# Patient Record
Sex: MALE | Race: WHITE | NOT HISPANIC OR LATINO | Employment: FULL TIME | URBAN - METROPOLITAN AREA
[De-identification: names, ages, dates, MRNs, and addresses within clinical notes are randomized per-mention and may not be internally consistent; named-entity substitution may affect disease eponyms.]

---

## 2020-09-02 ENCOUNTER — HOSPITAL ENCOUNTER (EMERGENCY)
Facility: MEDICAL CENTER | Age: 35
End: 2020-09-02
Attending: EMERGENCY MEDICINE
Payer: COMMERCIAL

## 2020-09-02 VITALS
HEART RATE: 96 BPM | TEMPERATURE: 98.2 F | DIASTOLIC BLOOD PRESSURE: 89 MMHG | OXYGEN SATURATION: 98 % | SYSTOLIC BLOOD PRESSURE: 145 MMHG | WEIGHT: 289.9 LBS | BODY MASS INDEX: 37.21 KG/M2 | HEIGHT: 74 IN | RESPIRATION RATE: 16 BRPM

## 2020-09-02 DIAGNOSIS — S61.210A LACERATION OF RIGHT INDEX FINGER WITHOUT FOREIGN BODY WITHOUT DAMAGE TO NAIL, INITIAL ENCOUNTER: ICD-10-CM

## 2020-09-02 PROCEDURE — 700101 HCHG RX REV CODE 250

## 2020-09-02 PROCEDURE — 304217 HCHG IRRIGATION SYSTEM

## 2020-09-02 PROCEDURE — 303747 HCHG EXTRA SUTURE

## 2020-09-02 PROCEDURE — 99283 EMERGENCY DEPT VISIT LOW MDM: CPT

## 2020-09-02 PROCEDURE — 304999 HCHG REPAIR-SIMPLE/INTERMED LEVEL 1

## 2020-09-02 RX ORDER — LIDOCAINE HYDROCHLORIDE AND EPINEPHRINE BITARTRATE 20; .01 MG/ML; MG/ML
10 INJECTION, SOLUTION SUBCUTANEOUS ONCE
Status: COMPLETED | OUTPATIENT
Start: 2020-09-02 | End: 2020-09-02

## 2020-09-02 RX ORDER — LIDOCAINE HYDROCHLORIDE 20 MG/ML
20 INJECTION, SOLUTION INFILTRATION; PERINEURAL ONCE
Status: COMPLETED | OUTPATIENT
Start: 2020-09-02 | End: 2020-09-02

## 2020-09-02 RX ADMIN — LIDOCAINE HYDROCHLORIDE 20 ML: 20 INJECTION, SOLUTION INFILTRATION; PERINEURAL at 20:15

## 2020-09-03 NOTE — ED PROVIDER NOTES
ED Provider Note    CHIEF COMPLAINT  Chief Complaint   Patient presents with   • Finger Injury     Laceration to right Index Finger       [facility] [place]  Dalton Farias is a 34 y.o. male who presents with complaint of laceration to right index finger.  Patient reports that he was in a pocket knife to open a box when it slipped and he cut his right index finger.  He denies any associated weakness or numbness.  He denies any other injury sustained.  He denies any difficulty moving his index finger since that time.  Patient reports he had a tetanus updated in the last 5 years.    REVIEW OF SYSTEMS  ROS  See HPI for further details. All other systems are negative.     PAST MEDICAL HISTORY       SOCIAL HISTORY  Social History     Tobacco Use   • Smoking status: Never Smoker   • Smokeless tobacco: Never Used   Substance and Sexual Activity   • Alcohol use: Not Currently   • Drug use: Not Currently   • Sexual activity: Not on file       SURGICAL HISTORY  patient denies any surgical history    CURRENT MEDICATIONS  Home Medications     Reviewed by Jay Jay Stauffer R.N. (Registered Nurse) on 09/02/20 at 1921  Med List Status: Not Addressed   Medication Last Dose Status        Patient Serafin Taking any Medications                       ALLERGIES  No Known Allergies    PHYSICAL EXAM  Physical Exam   Constitutional: He is oriented to person, place, and time. He appears well-developed and well-nourished.   Eyes: Conjunctivae are normal.   Neck: Normal range of motion. Neck supple.   Pulmonary/Chest: Effort normal.   Musculoskeletal:      Comments: Right exterior with 3 cm laceration at the proximal phalanx on the radial aspect.  Laceration is to muscle without any obvious violation of the tendon.  Strength is 5/5 in flexion and extension of the first digit.  Isolated flexion of the DIP is intact.  Interosseous muscles are 5 out of 5.  Abduction and abduction of the finger is 5 out of 5.  Sensation is intact throughout.  Cap refill is  instantaneous.   Neurological: He is alert and oriented to person, place, and time.   Skin: Skin is warm.   Psychiatric: He has a normal mood and affect. His behavior is normal.     Laceration Repair Procedure    Indication: Laceration    Location/Description: ight exterior with 3 cm laceration at the proximal phalanx on the radial aspect.    Procedure: The patient was placed in the appropriate position and anesthesia around the laceration was obtained by infiltration using 2% Lidocaine without epinephrine. The area was then irrigated with high pressure normal saline. The laceration was closed with 4-0 Ethilon using interrupted sutures. There were no additional lacerations requiring repair. The wound area was then dressed with a sterile dressing.      Total repaired wound length: 3 cm.     Other Items: Suture count: 8    The patient tolerated the procedure well.    Complications: None      COURSE & MEDICAL DECISION MAKING  Pertinent Labs & Imaging studies reviewed. (See chart for details)    Patient here with laceration to the finger without any obvious tendon injury however given the depth will place patient in splint after it is irrigated and repaired.  Patient tetanus is up-to-date.  Patient laceration repaired.  He was placed in a finger splint extending into his palm to mobilize the PIP and MCP and DIP.  Patient will follow-up with orthopedics for wound recheck in the next 7 days.  I have discussed return precautions including development infection.       The patient will return for worsening symptoms and is stable at the time of discharge. The patient verbalizes understanding and will comply.    FINAL IMPRESSION    1. Laceration of right index finger without foreign body without damage to nail, initial encounter               Electronically signed by: Cliff Sorenson M.D., 9/2/2020 7:44 PM

## 2020-09-03 NOTE — ED TRIAGE NOTES
"Dalton Farias   34 y.o. male   Chief Complaint   Patient presents with   • Finger Injury     Laceration to right Index Finger      The patient presents to the ED via triage for evaluation of a right index finger laceration. The patient reports opening a package with a knife and cutting himself as the blade of his knife was retracting. Bleeding controlled by direct patient pressure.     The patient denies knowingly being around anyone with suspected or confirmed COVID 19.    Patient ambulatory to triage with a steady gait for above mentioned complaint.  Patient is alert and oriented, speaking in full sentences, following commands and responds appropriately to questions. Not in any apparent distress. Respirations are even and unlabored.   Patient placed in lobby. Patient educated on triage process. Patient encouraged to alert staff of any changes in status.     /102   Pulse (!) 102   Temp 36.8 °C (98.2 °F) (Temporal)   Resp 16   Ht 1.88 m (6' 2\")   Wt (!) 131.5 kg (289 lb 14.5 oz)   SpO2 96%   BMI 37.22 kg/m²       "

## 2020-09-03 NOTE — DISCHARGE INSTRUCTIONS
Sutures will need to be removed in 7 to 10 days.  Please maintain the splint until that time. You will need to periodically remove the splint in order to change your  wound dressings.

## 2020-11-23 ENCOUNTER — TELEPHONE (OUTPATIENT)
Dept: SCHEDULING | Facility: IMAGING CENTER | Age: 35
End: 2020-11-23

## 2020-12-03 ENCOUNTER — OFFICE VISIT (OUTPATIENT)
Dept: MEDICAL GROUP | Facility: IMAGING CENTER | Age: 35
End: 2020-12-03
Payer: COMMERCIAL

## 2020-12-03 VITALS
HEIGHT: 74 IN | HEART RATE: 86 BPM | BODY MASS INDEX: 35.94 KG/M2 | SYSTOLIC BLOOD PRESSURE: 110 MMHG | RESPIRATION RATE: 12 BRPM | TEMPERATURE: 97.7 F | DIASTOLIC BLOOD PRESSURE: 68 MMHG | WEIGHT: 280 LBS | OXYGEN SATURATION: 95 %

## 2020-12-03 DIAGNOSIS — Z13.220 SCREENING FOR HYPERLIPIDEMIA: ICD-10-CM

## 2020-12-03 DIAGNOSIS — J32.9 CHRONIC SINUSITIS, UNSPECIFIED LOCATION: ICD-10-CM

## 2020-12-03 DIAGNOSIS — Z87.442 HISTORY OF KIDNEY STONES: ICD-10-CM

## 2020-12-03 DIAGNOSIS — Z13.1 SCREENING FOR DIABETES MELLITUS (DM): ICD-10-CM

## 2020-12-03 DIAGNOSIS — Z87.09 HISTORY OF NASAL POLYP: ICD-10-CM

## 2020-12-03 DIAGNOSIS — Z76.89 ENCOUNTER TO ESTABLISH CARE WITH NEW DOCTOR: ICD-10-CM

## 2020-12-03 DIAGNOSIS — Z13.0 SCREENING FOR DEFICIENCY ANEMIA: ICD-10-CM

## 2020-12-03 DIAGNOSIS — R23.8 SKIN BREAKDOWN: ICD-10-CM

## 2020-12-03 DIAGNOSIS — L60.0 INGROWN NAIL OF FIFTH TOE OF RIGHT FOOT: ICD-10-CM

## 2020-12-03 DIAGNOSIS — Z30.09 VASECTOMY EVALUATION: ICD-10-CM

## 2020-12-03 PROBLEM — J33.9 NASAL POLYPOSIS: Status: ACTIVE | Noted: 2019-03-08

## 2020-12-03 PROBLEM — J34.3 HYPERTROPHY OF NASAL TURBINATES: Status: ACTIVE | Noted: 2019-03-08

## 2020-12-03 PROCEDURE — 99203 OFFICE O/P NEW LOW 30 MIN: CPT | Performed by: NURSE PRACTITIONER

## 2020-12-03 SDOH — HEALTH STABILITY: MENTAL HEALTH: HOW OFTEN DO YOU HAVE A DRINK CONTAINING ALCOHOL?: 2-4 TIMES A MONTH

## 2020-12-03 SDOH — HEALTH STABILITY: MENTAL HEALTH: HOW OFTEN DO YOU HAVE 6 OR MORE DRINKS ON ONE OCCASION?: LESS THAN MONTHLY

## 2020-12-03 SDOH — HEALTH STABILITY: MENTAL HEALTH: HOW MANY STANDARD DRINKS CONTAINING ALCOHOL DO YOU HAVE ON A TYPICAL DAY?: 1 OR 2

## 2020-12-03 ASSESSMENT — PATIENT HEALTH QUESTIONNAIRE - PHQ9: CLINICAL INTERPRETATION OF PHQ2 SCORE: 0

## 2020-12-03 ASSESSMENT — PAIN SCALES - GENERAL: PAINLEVEL: NO PAIN

## 2020-12-03 NOTE — PROGRESS NOTES
Subjective:   CC: Establish Care, Ingrown Toenail (right side), and Foot Pain (left side, split skin)    HISTORY OF THE PRESENT ILLNESS: Patient is a 34 y.o. male. His prior PCP at [facility], [place], [place].  Patient has history of snoring, chronic sinusitis, hypertrophy of nasal turbinates, and, kidney stones. Patient is here today to establish care and discuss referrals to health history and concerns about his feet.     Reports he would like a referral to explore getting a vasectomy. Reports he is new to the area and him and his wife have been discussing this option for continued birth control. Reports having a 5 year old and 1 year old and considering being done having children. Reports history of kidney stones in 2019.    Reports since moving to [place] he has lost 27 lbs over the past 6 weeks. States he has changed his diet and working on portion control. States he has also become more physically activity by walking and hiking. States he is happy with the results he has achieved and is feeling good.    States he has history of ingrown toenails on his greater toes. Reports never having surgery to remove ingrown toenail and/or using antibiotics for infected ingrown toe nails. Reports he currently has an ingrown toe nail on right greater toe. Denies infection at this time. Reports that toe is tender to touch in certain portions and/or pressure. States he will get ingrown toe nails depending how he cuts his nails. Reports after this summer he also has a cracked left heel. States it is healing and improving since fall and starting to wear socks more often. States at its worse it was painful to fully apply pressure on his heel. Reports the daily pain is resolving and it is not painful anymore and it is slowly healing. Reports using OTC lotion to area.     Chronic sinusitis  Reports he has a history of ongoing nasal concerns. States symptoms have improved since he had sinus surgery. Denies any concerns at this  time. Reports he would like a referral to ENT since just moving to area and needing new ENT. Reports he use to see his ENT in [place], [place] about every 6 months. Reports using a daily nasal spray, unable to recall name. Will provide name at later date.    Allergies: Sulfa drugs    No current [facility]-ordered outpatient medications on file.     No current [facility]-ordered facility-administered medications on file.      Past Medical History:   Diagnosis Date   • Allergy     seasonal     Past Surgical History:   Procedure Laterality Date   • NASAL POLYPECTOMY  2019   • OTHER ORTHOPEDIC SURGERY      left hand surgery-1st, nerve repair, 2nd trigger finger   • OTHER ORTHOPEDIC SURGERY      right knee     Social History     Tobacco Use   • Smoking status: Former Smoker   • Smokeless tobacco: Former User   Substance Use Topics   • Alcohol use: Yes     Frequency: 2-4 times a month     Drinks per session: 1 or 2     Binge frequency: Less than monthly   • Drug use: Not Currently     Social History     Social History Narrative   • Not on file     Family History   Problem Relation Age of Onset   • Obesity Mother    • Obesity Father    • No Known Problems Sister    • Cancer Maternal Grandmother    • Alcohol abuse Maternal Grandfather    • Lung Disease Maternal Grandfather         smoker   • Lung Disease Paternal Grandmother         smoked   • Alcohol abuse Paternal Grandmother    • No Known Problems Paternal Grandfather      Health Maintenance: Completed.    ROS:   Constitutional: Denies fever, chills, night sweats, weight gain and/or malaise/fatigue.  Intentional weight loss, see HPI.  HENT: Denies sore throat, hearing loss, enlarged thyroid, or difficulty swallowing.  Chronic nasal congestion, see HPI.  Eyes: Denies changes in vision, pain. Does wear corrective wear, glasses for reading.  Respiratory: Denies cough, SOB at rest or activity.    Cardiovascular: Denies tachycardia, chest pain, palpitations, or  leg swelling.  "  Gastrointestinal: Denies N/V/C/D, abdominal pain, loss appetite, reflux, or hematochezia.  Genitourinary: Denies difficulty voiding, dysuria, nocturia, or hematuria.  History of kidney stones, see HPI.  Skin: Negative for rash or worrisome moles. Ingrown toenail and cracked heel, see HPI.  Neurological: Negative for dizziness, focal weakness and headaches.   Endo/Heme/Allergies: Denies bruise/bleed easily, allergies.   Psychiatric/Behavioral: Denies depression, nervous/anxious, difficulty sleeping.     Objective:   Exam: /68   Pulse 86   Temp 36.5 °C (97.7 °F)   Resp 12   Ht 1.88 m (6' 2\")   Wt (!) 127 kg (280 lb)   SpO2 95%  Body mass index is 35.95 kg/m².    General: Normal appearing. No distress.  HEENT: Normocephalic. Eyes conjunctiva clear lids without ptosis, PERRLA, ears normal shape and contour, canals are clear bilaterally, tympanic membranes pearly gray, intact, mildly bulging, no drainage noted. Oral and nasal examine deferred due to current COVID-19 outbreak, no acute concerns at this time. Patient wore a mask during visit.  Neck: Supple without JVD or abnormal masses. Small soft mobile thyroid palpated, no nodules palpated, non-tender.  Pulmonary: Clear to ausculation. Normal effort. No rales, ronchi, or wheezing.  Cardiovascular: Regular rate and rhythm without murmur. Pedal and radial pulses are intact and equal bilaterally.  Abdomen: Soft, nontender, nondistended. Normal bowel sounds. Liver and spleen are not palpable.  Neurologic: Grossly non-focal.  Lymph: No cervical, submandibular, or supraclavicular lymph nodes are palpable.  Skin: Warm and dry. Right greater toe ingrown toe nail, no infection noted at this time. Tenderness noted with light pressure. Curved toenails noted on both greater toes suggesting patient is prone to ingrown toe nails. Deep healing crack noted on left heel. No signs of infection at this time.    Musculoskeletal: Normal gait. No extremity cyanosis, clubbing, " or edema. DTR+2  Psych: Normal mood and affect. Alert and oriented x3. Judgment and insight is normal.    Assessment & Plan:   1. Encounter to establish care with new doctor  2. Screening for deficiency anemia  3. Screening for hyperlipidemia  4. Screening for diabetes mellitus (DM)  Reviewed with patient medication use and side effects. Medical, past, surgical history reviewed with patient. Discussed with patient the risk and benefits of receiving vaccines. Discussed CDC recommendations for immunizations and USPSTF guidelines for screening exams. Discussed receiving influenza vaccine,  verbalized understanding and declined. Encouraged patient to wash hands regularly and avoid sick contacts while supporting immune system. Praise and support given on recent weight loss.  Discussed the overall benefit of a well-balanced diet, regular exercise, and stress management, verbalized understanding. Encouraged accumulation of 150 minutes or more of moderate-intensity activity each week as tolerated. Discussed a healthy diet that is low in fat, sodium, and cholesterol, such as the mediterranean diet that is typically high in fruits, vegetables, whole grains, beans, nuts, and seeds, includes olive oil as an important source of monounsaturated fat, DASH diet, and/or also consider a plant-based diet.  Discussed preventive screening labs with patient, verbalized understanding. Will evaluate plan of care once labs are obtained.    -     Comp Metabolic Panel; Future        -     CBC WITH DIFFERENTIAL; Future        -     Lipid Profile; Future    5. Chronic sinusitis, unspecified location  6. History of nasal polyp  This is a chronic stable condition. Discussed referral to ENT for further evaluation, verbalized understanding and willingness to participation in referral.     -     REFERRAL TO ENT    7. Vasectomy evaluation  8. History of kidney stones  This is a chronic stable condition. Discussed referral to urology for further  evaluation and management, verbalized understanding and willingness to participation in referral.     -     REFERRAL TO UROLOGY    9. Ingrown nail of fifth toe of right foot  10. Skin breakdown  This is a chronic stable condition.  Discussed physical assessment findings with patient, verbalized understanding. Discussed referral to podiatry for further evaluation and management, verbalized understanding and willingness to participation in referral. Discussed the importance of not cutting nails too short and avoiding shoes that apply pressure to nailbed, verbalized understanding and willingness. Discussed signs and symptoms of infections and when to report it, verbalized understanding. Discussed a trial of Lanolin to dry cracked heel at HS and am, verbalized understanding and willingness.      -     REFERRAL TO PODIATRY    Return in about 3 months (around 3/3/2021) for Annual visit after completing lab work.    Please note that this dictation was created using voice recognition software. I have made every reasonable attempt to correct obvious errors, but I expect that there are errors of grammar and possibly content that I did not discover before finalizing the note.

## 2020-12-06 NOTE — ASSESSMENT & PLAN NOTE
Reports he has a history of ongoing nasal concerns. States symptoms have improved since he had sinus surgery. Denies any concerns at this time. Reports he would like a referral to ENT since just moving to area and needing new ENT. Reports he use to see his ENT in [place], [place] about every 6 months. Reports using a daily nasal spray, unable to recall name. Will provide name at later date.

## 2020-12-18 ENCOUNTER — HOSPITAL ENCOUNTER (OUTPATIENT)
Dept: LAB | Facility: MEDICAL CENTER | Age: 35
End: 2020-12-18
Attending: NURSE PRACTITIONER
Payer: COMMERCIAL

## 2020-12-18 DIAGNOSIS — Z13.1 SCREENING FOR DIABETES MELLITUS (DM): ICD-10-CM

## 2020-12-18 DIAGNOSIS — Z13.0 SCREENING FOR DEFICIENCY ANEMIA: ICD-10-CM

## 2020-12-18 DIAGNOSIS — Z13.220 SCREENING FOR HYPERLIPIDEMIA: ICD-10-CM

## 2020-12-18 LAB
ALBUMIN SERPL BCP-MCNC: 4.6 G/DL (ref 3.2–4.9)
ALBUMIN/GLOB SERPL: 1.6 G/DL
ALP SERPL-CCNC: 49 U/L (ref 30–99)
ALT SERPL-CCNC: 30 U/L (ref 2–50)
ANION GAP SERPL CALC-SCNC: 8 MMOL/L (ref 7–16)
AST SERPL-CCNC: 29 U/L (ref 12–45)
BASOPHILS # BLD AUTO: 1.8 % (ref 0–1.8)
BASOPHILS # BLD: 0.14 K/UL (ref 0–0.12)
BILIRUB SERPL-MCNC: 0.6 MG/DL (ref 0.1–1.5)
BUN SERPL-MCNC: 15 MG/DL (ref 8–22)
CALCIUM SERPL-MCNC: 10 MG/DL (ref 8.5–10.5)
CHLORIDE SERPL-SCNC: 105 MMOL/L (ref 96–112)
CHOLEST SERPL-MCNC: 129 MG/DL (ref 100–199)
CO2 SERPL-SCNC: 24 MMOL/L (ref 20–33)
CREAT SERPL-MCNC: 1.1 MG/DL (ref 0.5–1.4)
EOSINOPHIL # BLD AUTO: 0.76 K/UL (ref 0–0.51)
EOSINOPHIL NFR BLD: 9.9 % (ref 0–6.9)
ERYTHROCYTE [DISTWIDTH] IN BLOOD BY AUTOMATED COUNT: 44.9 FL (ref 35.9–50)
FASTING STATUS PATIENT QL REPORTED: NORMAL
GLOBULIN SER CALC-MCNC: 2.9 G/DL (ref 1.9–3.5)
GLUCOSE SERPL-MCNC: 84 MG/DL (ref 65–99)
HCT VFR BLD AUTO: 49.4 % (ref 42–52)
HDLC SERPL-MCNC: 50 MG/DL
HGB BLD-MCNC: 16.3 G/DL (ref 14–18)
IMM GRANULOCYTES # BLD AUTO: 0.03 K/UL (ref 0–0.11)
IMM GRANULOCYTES NFR BLD AUTO: 0.4 % (ref 0–0.9)
LDLC SERPL CALC-MCNC: 66 MG/DL
LYMPHOCYTES # BLD AUTO: 2.26 K/UL (ref 1–4.8)
LYMPHOCYTES NFR BLD: 29.3 % (ref 22–41)
MCH RBC QN AUTO: 30.1 PG (ref 27–33)
MCHC RBC AUTO-ENTMCNC: 33 G/DL (ref 33.7–35.3)
MCV RBC AUTO: 91.3 FL (ref 81.4–97.8)
MONOCYTES # BLD AUTO: 0.83 K/UL (ref 0–0.85)
MONOCYTES NFR BLD AUTO: 10.8 % (ref 0–13.4)
NEUTROPHILS # BLD AUTO: 3.69 K/UL (ref 1.82–7.42)
NEUTROPHILS NFR BLD: 47.8 % (ref 44–72)
NRBC # BLD AUTO: 0 K/UL
NRBC BLD-RTO: 0 /100 WBC
PLATELET # BLD AUTO: 266 K/UL (ref 164–446)
PMV BLD AUTO: 12.2 FL (ref 9–12.9)
POTASSIUM SERPL-SCNC: 4.3 MMOL/L (ref 3.6–5.5)
PROT SERPL-MCNC: 7.5 G/DL (ref 6–8.2)
RBC # BLD AUTO: 5.41 M/UL (ref 4.7–6.1)
SODIUM SERPL-SCNC: 137 MMOL/L (ref 135–145)
TRIGL SERPL-MCNC: 67 MG/DL (ref 0–149)
WBC # BLD AUTO: 7.7 K/UL (ref 4.8–10.8)

## 2020-12-18 PROCEDURE — 80061 LIPID PANEL: CPT

## 2020-12-18 PROCEDURE — 85025 COMPLETE CBC W/AUTO DIFF WBC: CPT

## 2020-12-18 PROCEDURE — 36415 COLL VENOUS BLD VENIPUNCTURE: CPT

## 2020-12-18 PROCEDURE — 80053 COMPREHEN METABOLIC PANEL: CPT

## 2021-04-05 ENCOUNTER — APPOINTMENT (OUTPATIENT)
Dept: RADIOLOGY | Facility: MEDICAL CENTER | Age: 36
End: 2021-04-05
Attending: PHYSICIAN ASSISTANT
Payer: COMMERCIAL

## 2021-04-20 ENCOUNTER — IMMUNIZATION (OUTPATIENT)
Dept: FAMILY PLANNING/WOMEN'S HEALTH CLINIC | Facility: IMMUNIZATION CENTER | Age: 36
End: 2021-04-20
Payer: COMMERCIAL

## 2021-04-20 DIAGNOSIS — Z23 ENCOUNTER FOR VACCINATION: Primary | ICD-10-CM

## 2021-04-20 PROCEDURE — 91300 PFIZER SARS-COV-2 VACCINE: CPT

## 2021-04-20 PROCEDURE — 0001A PFIZER SARS-COV-2 VACCINE: CPT

## 2021-05-13 ENCOUNTER — IMMUNIZATION (OUTPATIENT)
Dept: FAMILY PLANNING/WOMEN'S HEALTH CLINIC | Facility: IMMUNIZATION CENTER | Age: 36
End: 2021-05-13
Payer: COMMERCIAL

## 2021-05-13 DIAGNOSIS — Z23 ENCOUNTER FOR VACCINATION: Primary | ICD-10-CM

## 2021-05-13 PROCEDURE — 91300 PFIZER SARS-COV-2 VACCINE: CPT | Performed by: NURSE PRACTITIONER

## 2021-05-13 PROCEDURE — 0002A PFIZER SARS-COV-2 VACCINE: CPT | Performed by: NURSE PRACTITIONER

## 2021-08-04 NOTE — ED NOTES
Discharge instructions reviewed and signed with patient. No questions at this time. Patient provided with dressing supplies. Patient ambulated out of ED with a steady gait.    Patient notified and will wait for Dr Mc to write the letter but in the mean time will start the process of refund    Electronically Signed by:    Ericka Arnold CMA , 8/4/2021

## 2021-12-03 ENCOUNTER — OFFICE VISIT (OUTPATIENT)
Dept: MEDICAL GROUP | Facility: IMAGING CENTER | Age: 36
End: 2021-12-03
Payer: COMMERCIAL

## 2021-12-03 VITALS
TEMPERATURE: 98.5 F | HEART RATE: 65 BPM | WEIGHT: 218 LBS | BODY MASS INDEX: 27.98 KG/M2 | OXYGEN SATURATION: 97 % | DIASTOLIC BLOOD PRESSURE: 54 MMHG | HEIGHT: 74 IN | SYSTOLIC BLOOD PRESSURE: 110 MMHG

## 2021-12-03 DIAGNOSIS — Z13.220 SCREENING CHOLESTEROL LEVEL: ICD-10-CM

## 2021-12-03 DIAGNOSIS — Z13.1 DIABETES MELLITUS SCREENING: ICD-10-CM

## 2021-12-03 DIAGNOSIS — Z13.31 SCREENING FOR DEPRESSION: ICD-10-CM

## 2021-12-03 DIAGNOSIS — Z13.21 ENCOUNTER FOR VITAMIN DEFICIENCY SCREENING: ICD-10-CM

## 2021-12-03 DIAGNOSIS — Z71.3 DIETARY COUNSELING: ICD-10-CM

## 2021-12-03 DIAGNOSIS — Z13.29 SCREENING FOR THYROID DISORDER: ICD-10-CM

## 2021-12-03 DIAGNOSIS — R63.4 EXCESSIVE WEIGHT LOSS: ICD-10-CM

## 2021-12-03 DIAGNOSIS — Z71.82 EXERCISE COUNSELING: ICD-10-CM

## 2021-12-03 DIAGNOSIS — Z00.00 WELLNESS EXAMINATION: ICD-10-CM

## 2021-12-03 DIAGNOSIS — Z13.0 SCREENING FOR DEFICIENCY ANEMIA: ICD-10-CM

## 2021-12-03 PROBLEM — J33.9 NASAL POLYPOSIS: Status: RESOLVED | Noted: 2019-03-08 | Resolved: 2021-12-03

## 2021-12-03 PROBLEM — J32.9 CHRONIC SINUSITIS: Status: RESOLVED | Noted: 2019-03-08 | Resolved: 2021-12-03

## 2021-12-03 PROBLEM — J34.3 HYPERTROPHY OF NASAL TURBINATES: Status: RESOLVED | Noted: 2019-03-08 | Resolved: 2021-12-03

## 2021-12-03 PROCEDURE — 99395 PREV VISIT EST AGE 18-39: CPT | Performed by: PHYSICIAN ASSISTANT

## 2021-12-03 ASSESSMENT — PATIENT HEALTH QUESTIONNAIRE - PHQ9: CLINICAL INTERPRETATION OF PHQ2 SCORE: 0

## 2021-12-03 ASSESSMENT — PAIN SCALES - GENERAL: PAINLEVEL: NO PAIN

## 2021-12-03 ASSESSMENT — FIBROSIS 4 INDEX: FIB4 SCORE: 0.7

## 2021-12-03 NOTE — PATIENT INSTRUCTIONS
Once resulted, your lab/test/imaging results will show up automatically in your MyChart. Please wait for my interpretation and recommendations prior to viewing your results to avoid any unnecessary confusion or misinterpretation. I will address all of the lab values that I interpret as abnormal and message you accordingly on your MyChart. I will always send you a message on your labs even if they are normal. If you do not hear back from me within 5 business days after getting your labs drawn, then please send me a message on MyChart so I can obtain your labs (especially if you went to an outside lab - [facility], [facility], etc). If you have any additional questions or concerns beyond my interpretation of your results, please make an appointment with me to discuss in further detail.    Please only use the [employer] messaging system for questions regarding your most recent appointment or if advised to use otherwise (glucose or blood pressure reporting). If you have any new problems or concerns, you must make an appointment to discuss. This includes any referral requests.     Thank you,    Sonia Soriano PA-C ([place])  Physician Assistant Certified  [facility]

## 2021-12-03 NOTE — PROGRESS NOTES
"Subjective:     CC:   Chief Complaint   Patient presents with   • Establish Care   • Annual Exam       HPI:   Dalton presents today for his annual physical.  He is overall feeling well.  He has lost around 90 pounds intentionally over the past 14 months.  He states that he has been following a low-carb diet and tracking his macros.  Has been exercising around 5 days a week including weightlifting and cardio.  He overall feels good.  No injuries.  No chest pain, palpitations, dizziness.  He has been Covid vaccinated.  Refusing flu shot today.  No prescription medications but does take a daily multivitamin.  No tobacco use.  No daily alcohol use.      Depression Screening    Little interest or pleasure in doing things?  0 - not at all  Feeling down, depressed , or hopeless? 0 - not at all  Patient Health Questionnaire Score: 0      Past Medical History:   Diagnosis Date   • Allergy     seasonal     Social History     Tobacco Use   • Smoking status: Former Smoker     Packs/day: 0.50     Years: 5.00     Pack years: 2.50   • Smokeless tobacco: Former User   Vaping Use   • Vaping Use: Never used   Substance Use Topics   • Alcohol use: Yes     Comment: not daily   • Drug use: Not Currently     Current Outpatient Medications Ordered in Epic   Medication Sig Dispense Refill   • IBUPROFEN PO Take  by mouth.     • multivitamin (THERAGRAN) Tab Take 1 Tablet by mouth every day.     • Misc Natural Products (JOINT SUPPORT PO) Take  by mouth.       No current Epic-ordered facility-administered medications on file.     Sulfa drugs    Health Maintenance: refusing flu shot    ROS: see hpi  Gen: no fevers/chills  Pulm: no sob, no cough  CV: no chest pain, no palpitations, no edema  GI: no nausea/vomiting, no diarrhea  Skin: no rash    Objective:   Exam:  /54 (BP Location: Left arm, Patient Position: Sitting, BP Cuff Size: Adult)   Pulse 65   Temp 36.9 °C (98.5 °F) (Temporal)   Ht 1.88 m (6' 2\")   Wt 98.9 kg (218 lb)   SpO2 " 97%   BMI 27.99 kg/m²    Body mass index is 27.99 kg/m².    Gen: Alert and oriented, No apparent distress.  HEENT: Head atraumatic, normocephalic. Pupils equal and round.  Neck: Neck is supple without lymphadenopathy.   Lungs: Normal effort, CTA bilaterally, no wheezes, rhonchi, or rales  CV: Regular rate and rhythm. No murmurs, rubs, or gallops.  ABD: +BS. Non-tender, non-distended. No rebound, rigidity, or guarding. No HSM.  Ext: No clubbing, cyanosis, edema.    Assessment & Plan:     35 y.o. male with the following -     1. Wellness examination  Pleasant 35-year-old male here for annual physical.  History reviewed.  Vaccinations discussed.  Previous labs and records reviewed with patient in depth.    2. Excessive weight loss  Intentional, continue diet and exercise as tolerated.  Annual lab orders given to patient today.  - CBC WITH DIFFERENTIAL; Future  - Comp Metabolic Panel; Future  - TSH WITH REFLEX TO FT4; Future    3. Screening for deficiency anemia  - CBC WITH DIFFERENTIAL; Future    4. Encounter for vitamin deficiency screening  - VITAMIN B12; Future  - VITAMIN D,25 HYDROXY; Future    5. Diabetes mellitus screening  - Comp Metabolic Panel; Future    6. Screening cholesterol level  - Lipid Profile; Future    7. Screening for thyroid disorder  - TSH WITH REFLEX TO FT4; Future    8. Dietary counseling  9. Exercise counseling  Please continue working on lifestyle modifications. This includes accumulation of 150 minutes or more of moderate-intensity activity each week as tolerated and a healthy diet that is low in saturated fat, sodium, and cholesterol, such as the mediterranean diet that is typically high in fruits, vegetables, whole grains, beans, nuts, and seeds, includes olive oil as an important source of monounsaturated fat, DASH diet, and/or also consider a plant-based diet.    10. Screening for depression  PHQ0    Return for As needed.    Sonia Soriano PA-C ([place]aker)  Physician Assistant Certified  [facility]  [facility]    Please note that this dictation was created using voice recognition software. I have made every reasonable attempt to correct obvious errors, but I expect that there are errors of grammar and possibly content that I did not discover before finalizing the note.

## 2022-01-12 ENCOUNTER — HOSPITAL ENCOUNTER (OUTPATIENT)
Facility: MEDICAL CENTER | Age: 37
End: 2022-01-12
Attending: PHYSICIAN ASSISTANT
Payer: COMMERCIAL

## 2022-01-12 ENCOUNTER — NON-PROVIDER VISIT (OUTPATIENT)
Dept: MEDICAL GROUP | Facility: IMAGING CENTER | Age: 37
End: 2022-01-12
Payer: COMMERCIAL

## 2022-01-12 DIAGNOSIS — Z11.52 ENCOUNTER FOR SCREENING FOR COVID-19: ICD-10-CM

## 2022-01-12 LAB — COVID ORDER STATUS COVID19: NORMAL

## 2022-01-12 PROCEDURE — U0005 INFEC AGEN DETEC AMPLI PROBE: HCPCS

## 2022-01-12 PROCEDURE — 99999 PR NO CHARGE: CPT

## 2022-01-12 PROCEDURE — U0003 INFECTIOUS AGENT DETECTION BY NUCLEIC ACID (DNA OR RNA); SEVERE ACUTE RESPIRATORY SYNDROME CORONAVIRUS 2 (SARS-COV-2) (CORONAVIRUS DISEASE [COVID-19]), AMPLIFIED PROBE TECHNIQUE, MAKING USE OF HIGH THROUGHPUT TECHNOLOGIES AS DESCRIBED BY CMS-2020-01-R: HCPCS

## 2022-01-12 NOTE — PROGRESS NOTES
Dalton Farias is a 36 y.o. male here for a non-provider visit for COVID testing.    Clinic collect COVID order in system?: Yes    Patient tolerated specimen collection and no adverse effects were observed or reported: Yes    Patient states wife tested positive via an at home test. Patient states symptoms of a running nose that is normal for him but over all feels fine. Notified patient of isolation recommendations. Patient states understanding and no further questions at this time.

## 2022-01-13 LAB
SARS-COV-2 RNA RESP QL NAA+PROBE: DETECTED
SPECIMEN SOURCE: ABNORMAL

## 2022-01-25 ENCOUNTER — OFFICE VISIT (OUTPATIENT)
Dept: MEDICAL GROUP | Facility: IMAGING CENTER | Age: 37
End: 2022-01-25
Payer: COMMERCIAL

## 2022-01-25 VITALS
DIASTOLIC BLOOD PRESSURE: 56 MMHG | OXYGEN SATURATION: 95 % | TEMPERATURE: 99.3 F | WEIGHT: 226 LBS | HEIGHT: 74 IN | BODY MASS INDEX: 29 KG/M2 | SYSTOLIC BLOOD PRESSURE: 124 MMHG | HEART RATE: 75 BPM

## 2022-01-25 DIAGNOSIS — J33.9 NASAL POLYP: ICD-10-CM

## 2022-01-25 PROCEDURE — 99213 OFFICE O/P EST LOW 20 MIN: CPT | Performed by: PHYSICIAN ASSISTANT

## 2022-01-25 ASSESSMENT — PAIN SCALES - GENERAL: PAINLEVEL: NO PAIN

## 2022-01-25 ASSESSMENT — FIBROSIS 4 INDEX: FIB4 SCORE: 0.72

## 2022-01-26 NOTE — PROGRESS NOTES
"Subjective:     CC:   Chief Complaint   Patient presents with   • Other     nasal polyp       HPI:   Dalton presents today to discuss:    Nasal polyp  Here for follow-up regarding left-sided nasal polyp.  He states that he blew his nose 2 weeks ago and released a nasal polyp.  He states that he has had nasal polypectomy in 2019 and is requesting a referral to ENT today.  He has been using twice daily beclomethasone nasal spray.  No antihistamine use.      Past Medical History:   Diagnosis Date   • Allergy     seasonal     Social History     Tobacco Use   • Smoking status: Former Smoker     Packs/day: 0.50     Years: 5.00     Pack years: 2.50   • Smokeless tobacco: Former User   Vaping Use   • Vaping Use: Never used   Substance Use Topics   • Alcohol use: Yes     Comment: not daily   • Drug use: Not Currently     Current Outpatient Medications Ordered in Epic   Medication Sig Dispense Refill   • beclomethasone (BECONASE-AQ) 42 MCG/SPRAY nasal spray Administer 2 Sprays into affected nostril(S) 2 times a day.     • IBUPROFEN PO Take  by mouth.     • multivitamin (THERAGRAN) Tab Take 1 Tablet by mouth every day.     • Misc Natural Products (JOINT SUPPORT PO) Take  by mouth.       No current Epic-ordered facility-administered medications on file.     Sulfa drugs    ROS: see hpi  Gen: no fevers/chills  Pulm: no sob, no cough  GI: no nausea/vomiting, no diarrhea  Skin: no rash    Objective:   Exam:  /56 (BP Location: Left arm, Patient Position: Sitting, BP Cuff Size: Adult)   Pulse 75   Temp 37.4 °C (99.3 °F) (Temporal)   Ht 1.88 m (6' 2\")   Wt 103 kg (226 lb)   SpO2 95%   BMI 29.02 kg/m²    Body mass index is 29.02 kg/m².    Gen: Alert and oriented, No apparent distress.  HEENT: Head atraumatic, normocephalic. Pupils equal and round.  Bilateral nasal passages without edema or erythema.  No visible polyps.  Lungs: Normal effort, CTA bilaterally, no wheezes, rhonchi, or rales  CV: Regular rate and rhythm. No " murmurs, rubs, or gallops.  Ext: No clubbing, cyanosis, edema.    Assessment & Plan:     36 y.o. male with the following -     1. Nasal polyp  We will refer to ENT for further management.  Continue nasal corticosteroid.  Saline rinses as needed.  - Referral to ENT    Return for As needed.     Encouraged patient to get labs drawn from previous visit.    Sonia Soriano PA-C ([place])  Physician Assistant Certified  [facility]    Please note that this dictation was created using voice recognition software. I have made every reasonable attempt to correct obvious errors, but I expect that there are errors of grammar and possibly content that I did not discover before finalizing the note.

## 2022-01-26 NOTE — ASSESSMENT & PLAN NOTE
Here for follow-up regarding left-sided nasal polyp.  He states that he blew his nose 2 weeks ago and released a nasal polyp.  He states that he has had nasal polypectomy in 2019 and is requesting a referral to ENT today.  He has been using twice daily beclomethasone nasal spray.  No antihistamine use.

## 2022-02-02 ENCOUNTER — OFFICE VISIT (OUTPATIENT)
Dept: MEDICAL GROUP | Facility: IMAGING CENTER | Age: 37
End: 2022-02-02
Payer: COMMERCIAL

## 2022-02-02 VITALS
HEIGHT: 74 IN | BODY MASS INDEX: 28.67 KG/M2 | WEIGHT: 223.4 LBS | SYSTOLIC BLOOD PRESSURE: 122 MMHG | TEMPERATURE: 99.1 F | HEART RATE: 75 BPM | RESPIRATION RATE: 12 BRPM | OXYGEN SATURATION: 100 % | DIASTOLIC BLOOD PRESSURE: 72 MMHG

## 2022-02-02 DIAGNOSIS — Z72.51 HIGH RISK HETEROSEXUAL BEHAVIOR: ICD-10-CM

## 2022-02-02 PROCEDURE — 99212 OFFICE O/P EST SF 10 MIN: CPT | Performed by: CLINICAL NURSE SPECIALIST

## 2022-02-02 ASSESSMENT — ENCOUNTER SYMPTOMS
SHORTNESS OF BREATH: 0
MYALGIAS: 0
SORE THROAT: 0
COUGH: 0
DIAPHORESIS: 0
WEIGHT LOSS: 0
FEVER: 0

## 2022-02-02 ASSESSMENT — PAIN SCALES - GENERAL: PAINLEVEL: NO PAIN

## 2022-02-02 ASSESSMENT — FIBROSIS 4 INDEX: FIB4 SCORE: 0.72

## 2022-02-02 ASSESSMENT — PATIENT HEALTH QUESTIONNAIRE - PHQ9: CLINICAL INTERPRETATION OF PHQ2 SCORE: 0

## 2022-02-02 NOTE — PROGRESS NOTES
"Subjective     Dalton Farias is a 36 y.o. male who presents with Requesting Labs (std testing)            HPI  Dalton is in clinic requesting STD testing. He is  but polyamorous, heterosexual.  He denies  symptoms including penile discharge, urinary frequency, urgency, pain, hematuria, masses, lesions.    Review of Systems   Constitutional: Negative for diaphoresis, fever and weight loss.   HENT: Negative for sore throat.    Respiratory: Negative for cough and shortness of breath.    Genitourinary: Negative for dysuria, frequency, hematuria and urgency.   Musculoskeletal: Negative for joint pain and myalgias.   Skin: Negative for itching and rash.            Objective     /72 (BP Location: Left arm, Patient Position: Sitting, BP Cuff Size: Adult)   Pulse 75   Temp 37.3 °C (99.1 °F) (Temporal)   Resp 12   Ht 1.88 m (6' 2\")   Wt 101 kg (223 lb 6.4 oz)   SpO2 100%   BMI 28.68 kg/m²      Physical Exam  Constitutional:       General: He is not in acute distress.     Appearance: He is not ill-appearing, toxic-appearing or diaphoretic.   HENT:      Head: Normocephalic and atraumatic.   Eyes:      Pupils: Pupils are equal, round, and reactive to light.   Cardiovascular:      Rate and Rhythm: Normal rate and regular rhythm.      Heart sounds: Normal heart sounds.   Pulmonary:      Effort: Pulmonary effort is normal.      Breath sounds: Normal breath sounds.   Skin:     General: Skin is warm and dry.   Neurological:      Mental Status: He is alert and oriented to person, place, and time.   Psychiatric:         Mood and Affect: Mood normal.         Behavior: Behavior normal.         Thought Content: Thought content normal.         Judgment: Judgment normal.                             Assessment & Plan        1. High risk heterosexual behavior  Standing order placed for STI testing.  - T.PALLIDUM AB EIA; Standing  - HIV AG/AB COMBO ASSAY SCREENING; Standing  - Chlamydia/GC PCR Urine Or Swab; " Standing

## 2022-06-30 ENCOUNTER — HOSPITAL ENCOUNTER (OUTPATIENT)
Dept: LAB | Facility: MEDICAL CENTER | Age: 37
End: 2022-06-30
Attending: CLINICAL NURSE SPECIALIST
Payer: COMMERCIAL

## 2022-06-30 ENCOUNTER — HOSPITAL ENCOUNTER (OUTPATIENT)
Dept: LAB | Facility: MEDICAL CENTER | Age: 37
End: 2022-06-30
Attending: PHYSICIAN ASSISTANT
Payer: COMMERCIAL

## 2022-06-30 DIAGNOSIS — R63.4 EXCESSIVE WEIGHT LOSS: ICD-10-CM

## 2022-06-30 DIAGNOSIS — Z13.1 DIABETES MELLITUS SCREENING: ICD-10-CM

## 2022-06-30 DIAGNOSIS — Z13.220 SCREENING CHOLESTEROL LEVEL: ICD-10-CM

## 2022-06-30 DIAGNOSIS — Z72.51 HIGH RISK HETEROSEXUAL BEHAVIOR: ICD-10-CM

## 2022-06-30 DIAGNOSIS — Z13.29 SCREENING FOR THYROID DISORDER: ICD-10-CM

## 2022-06-30 DIAGNOSIS — Z13.0 SCREENING FOR DEFICIENCY ANEMIA: ICD-10-CM

## 2022-06-30 DIAGNOSIS — Z13.21 ENCOUNTER FOR VITAMIN DEFICIENCY SCREENING: ICD-10-CM

## 2022-06-30 LAB
25(OH)D3 SERPL-MCNC: 37 NG/ML (ref 30–100)
ALBUMIN SERPL BCP-MCNC: 4.6 G/DL (ref 3.2–4.9)
ALBUMIN/GLOB SERPL: 1.6 G/DL
ALP SERPL-CCNC: 58 U/L (ref 30–99)
ALT SERPL-CCNC: 15 U/L (ref 2–50)
ANION GAP SERPL CALC-SCNC: 11 MMOL/L (ref 7–16)
AST SERPL-CCNC: 17 U/L (ref 12–45)
BASOPHILS # BLD AUTO: 1.5 % (ref 0–1.8)
BASOPHILS # BLD: 0.15 K/UL (ref 0–0.12)
BILIRUB SERPL-MCNC: 0.7 MG/DL (ref 0.1–1.5)
BUN SERPL-MCNC: 11 MG/DL (ref 8–22)
CALCIUM SERPL-MCNC: 9.7 MG/DL (ref 8.5–10.5)
CHLORIDE SERPL-SCNC: 104 MMOL/L (ref 96–112)
CHOLEST SERPL-MCNC: 157 MG/DL (ref 100–199)
CO2 SERPL-SCNC: 25 MMOL/L (ref 20–33)
CREAT SERPL-MCNC: 0.95 MG/DL (ref 0.5–1.4)
EOSINOPHIL # BLD AUTO: 0.55 K/UL (ref 0–0.51)
EOSINOPHIL NFR BLD: 5.3 % (ref 0–6.9)
ERYTHROCYTE [DISTWIDTH] IN BLOOD BY AUTOMATED COUNT: 41.4 FL (ref 35.9–50)
GFR SERPLBLD CREATININE-BSD FMLA CKD-EPI: 106 ML/MIN/1.73 M 2
GLOBULIN SER CALC-MCNC: 2.9 G/DL (ref 1.9–3.5)
GLUCOSE SERPL-MCNC: 96 MG/DL (ref 65–99)
HCT VFR BLD AUTO: 46.3 % (ref 42–52)
HDLC SERPL-MCNC: 68 MG/DL
HGB BLD-MCNC: 15.5 G/DL (ref 14–18)
HIV 1+2 AB+HIV1 P24 AG SERPL QL IA: NORMAL
IMM GRANULOCYTES # BLD AUTO: 0.03 K/UL (ref 0–0.11)
IMM GRANULOCYTES NFR BLD AUTO: 0.3 % (ref 0–0.9)
LDLC SERPL CALC-MCNC: 79 MG/DL
LYMPHOCYTES # BLD AUTO: 2.77 K/UL (ref 1–4.8)
LYMPHOCYTES NFR BLD: 26.8 % (ref 22–41)
MCH RBC QN AUTO: 29.9 PG (ref 27–33)
MCHC RBC AUTO-ENTMCNC: 33.5 G/DL (ref 33.7–35.3)
MCV RBC AUTO: 89.4 FL (ref 81.4–97.8)
MONOCYTES # BLD AUTO: 0.7 K/UL (ref 0–0.85)
MONOCYTES NFR BLD AUTO: 6.8 % (ref 0–13.4)
NEUTROPHILS # BLD AUTO: 6.13 K/UL (ref 1.82–7.42)
NEUTROPHILS NFR BLD: 59.3 % (ref 44–72)
NRBC # BLD AUTO: 0 K/UL
NRBC BLD-RTO: 0 /100 WBC
PLATELET # BLD AUTO: 331 K/UL (ref 164–446)
PMV BLD AUTO: 10.5 FL (ref 9–12.9)
POTASSIUM SERPL-SCNC: 4.4 MMOL/L (ref 3.6–5.5)
PROT SERPL-MCNC: 7.5 G/DL (ref 6–8.2)
RBC # BLD AUTO: 5.18 M/UL (ref 4.7–6.1)
SODIUM SERPL-SCNC: 140 MMOL/L (ref 135–145)
T PALLIDUM AB SER QL IA: NORMAL
TRIGL SERPL-MCNC: 50 MG/DL (ref 0–149)
TSH SERPL DL<=0.005 MIU/L-ACNC: 1.46 UIU/ML (ref 0.38–5.33)
VIT B12 SERPL-MCNC: 535 PG/ML (ref 211–911)
WBC # BLD AUTO: 10.3 K/UL (ref 4.8–10.8)

## 2022-06-30 PROCEDURE — 87591 N.GONORRHOEAE DNA AMP PROB: CPT

## 2022-06-30 PROCEDURE — 82607 VITAMIN B-12: CPT

## 2022-06-30 PROCEDURE — 86780 TREPONEMA PALLIDUM: CPT

## 2022-06-30 PROCEDURE — 85025 COMPLETE CBC W/AUTO DIFF WBC: CPT

## 2022-06-30 PROCEDURE — 82306 VITAMIN D 25 HYDROXY: CPT

## 2022-06-30 PROCEDURE — 87491 CHLMYD TRACH DNA AMP PROBE: CPT

## 2022-06-30 PROCEDURE — 36415 COLL VENOUS BLD VENIPUNCTURE: CPT

## 2022-06-30 PROCEDURE — 80053 COMPREHEN METABOLIC PANEL: CPT

## 2022-06-30 PROCEDURE — 84443 ASSAY THYROID STIM HORMONE: CPT

## 2022-06-30 PROCEDURE — 80061 LIPID PANEL: CPT

## 2022-06-30 PROCEDURE — 87389 HIV-1 AG W/HIV-1&-2 AB AG IA: CPT

## 2022-07-01 LAB
C TRACH DNA SPEC QL NAA+PROBE: NEGATIVE
N GONORRHOEA DNA SPEC QL NAA+PROBE: NEGATIVE
SPECIMEN SOURCE: NORMAL

## 2023-01-02 SDOH — ECONOMIC STABILITY: TRANSPORTATION INSECURITY
IN THE PAST 12 MONTHS, HAS LACK OF TRANSPORTATION KEPT YOU FROM MEETINGS, WORK, OR FROM GETTING THINGS NEEDED FOR DAILY LIVING?: NO

## 2023-01-02 SDOH — HEALTH STABILITY: PHYSICAL HEALTH: ON AVERAGE, HOW MANY MINUTES DO YOU ENGAGE IN EXERCISE AT THIS LEVEL?: 40 MIN

## 2023-01-02 SDOH — ECONOMIC STABILITY: FOOD INSECURITY: WITHIN THE PAST 12 MONTHS, THE FOOD YOU BOUGHT JUST DIDN'T LAST AND YOU DIDN'T HAVE MONEY TO GET MORE.: NEVER TRUE

## 2023-01-02 SDOH — ECONOMIC STABILITY: HOUSING INSECURITY: IN THE LAST 12 MONTHS, HOW MANY PLACES HAVE YOU LIVED?: 1

## 2023-01-02 SDOH — ECONOMIC STABILITY: INCOME INSECURITY: IN THE LAST 12 MONTHS, WAS THERE A TIME WHEN YOU WERE NOT ABLE TO PAY THE MORTGAGE OR RENT ON TIME?: NO

## 2023-01-02 SDOH — HEALTH STABILITY: PHYSICAL HEALTH: ON AVERAGE, HOW MANY DAYS PER WEEK DO YOU ENGAGE IN MODERATE TO STRENUOUS EXERCISE (LIKE A BRISK WALK)?: 4 DAYS

## 2023-01-02 SDOH — ECONOMIC STABILITY: TRANSPORTATION INSECURITY
IN THE PAST 12 MONTHS, HAS THE LACK OF TRANSPORTATION KEPT YOU FROM MEDICAL APPOINTMENTS OR FROM GETTING MEDICATIONS?: NO

## 2023-01-02 SDOH — ECONOMIC STABILITY: INCOME INSECURITY: HOW HARD IS IT FOR YOU TO PAY FOR THE VERY BASICS LIKE FOOD, HOUSING, MEDICAL CARE, AND HEATING?: NOT VERY HARD

## 2023-01-02 SDOH — ECONOMIC STABILITY: FOOD INSECURITY: WITHIN THE PAST 12 MONTHS, YOU WORRIED THAT YOUR FOOD WOULD RUN OUT BEFORE YOU GOT MONEY TO BUY MORE.: NEVER TRUE

## 2023-01-02 SDOH — ECONOMIC STABILITY: HOUSING INSECURITY
IN THE LAST 12 MONTHS, WAS THERE A TIME WHEN YOU DID NOT HAVE A STEADY PLACE TO SLEEP OR SLEPT IN A SHELTER (INCLUDING NOW)?: NO

## 2023-01-02 ASSESSMENT — LIFESTYLE VARIABLES
SKIP TO QUESTIONS 9-10: 0
HOW OFTEN DO YOU HAVE SIX OR MORE DRINKS ON ONE OCCASION: LESS THAN MONTHLY
HOW MANY STANDARD DRINKS CONTAINING ALCOHOL DO YOU HAVE ON A TYPICAL DAY: 3 OR 4
AUDIT-C TOTAL SCORE: 4
HOW OFTEN DO YOU HAVE A DRINK CONTAINING ALCOHOL: 2-4 TIMES A MONTH

## 2023-01-02 ASSESSMENT — SOCIAL DETERMINANTS OF HEALTH (SDOH)
DO YOU BELONG TO ANY CLUBS OR ORGANIZATIONS SUCH AS CHURCH GROUPS UNIONS, FRATERNAL OR ATHLETIC GROUPS, OR SCHOOL GROUPS?: NO
IN A TYPICAL WEEK, HOW MANY TIMES DO YOU TALK ON THE PHONE WITH FAMILY, FRIENDS, OR NEIGHBORS?: ONCE A WEEK
HOW OFTEN DO YOU ATTENT MEETINGS OF THE CLUB OR ORGANIZATION YOU BELONG TO?: NEVER
HOW OFTEN DO YOU ATTEND CHURCH OR RELIGIOUS SERVICES?: NEVER
HOW OFTEN DO YOU GET TOGETHER WITH FRIENDS OR RELATIVES?: ONCE A WEEK

## 2023-01-04 ENCOUNTER — OFFICE VISIT (OUTPATIENT)
Dept: MEDICAL GROUP | Facility: IMAGING CENTER | Age: 38
End: 2023-01-04
Payer: COMMERCIAL

## 2023-01-04 VITALS
SYSTOLIC BLOOD PRESSURE: 118 MMHG | WEIGHT: 238 LBS | RESPIRATION RATE: 14 BRPM | TEMPERATURE: 98.3 F | HEIGHT: 74 IN | BODY MASS INDEX: 30.54 KG/M2 | DIASTOLIC BLOOD PRESSURE: 78 MMHG | OXYGEN SATURATION: 95 % | HEART RATE: 77 BPM

## 2023-01-04 DIAGNOSIS — J45.990 EXERCISE-INDUCED ASTHMA: ICD-10-CM

## 2023-01-04 DIAGNOSIS — R63.5 RECENT WEIGHT GAIN: ICD-10-CM

## 2023-01-04 DIAGNOSIS — R41.840 DIFFICULTY CONCENTRATING: ICD-10-CM

## 2023-01-04 PROCEDURE — 99214 OFFICE O/P EST MOD 30 MIN: CPT | Performed by: CLINICAL NURSE SPECIALIST

## 2023-01-04 SDOH — HEALTH STABILITY: PHYSICAL HEALTH: ON AVERAGE, HOW MANY DAYS PER WEEK DO YOU ENGAGE IN MODERATE TO STRENUOUS EXERCISE (LIKE A BRISK WALK)?: 4 DAYS

## 2023-01-04 SDOH — ECONOMIC STABILITY: HOUSING INSECURITY: IN THE LAST 12 MONTHS, HOW MANY PLACES HAVE YOU LIVED?: 1

## 2023-01-04 SDOH — ECONOMIC STABILITY: TRANSPORTATION INSECURITY
IN THE PAST 12 MONTHS, HAS LACK OF RELIABLE TRANSPORTATION KEPT YOU FROM MEDICAL APPOINTMENTS, MEETINGS, WORK OR FROM GETTING THINGS NEEDED FOR DAILY LIVING?: NO

## 2023-01-04 SDOH — HEALTH STABILITY: PHYSICAL HEALTH: ON AVERAGE, HOW MANY MINUTES DO YOU ENGAGE IN EXERCISE AT THIS LEVEL?: 40 MIN

## 2023-01-04 SDOH — HEALTH STABILITY: MENTAL HEALTH
STRESS IS WHEN SOMEONE FEELS TENSE, NERVOUS, ANXIOUS, OR CAN'T SLEEP AT NIGHT BECAUSE THEIR MIND IS TROUBLED. HOW STRESSED ARE YOU?: TO SOME EXTENT

## 2023-01-04 ASSESSMENT — SOCIAL DETERMINANTS OF HEALTH (SDOH)
HOW OFTEN DO YOU HAVE A DRINK CONTAINING ALCOHOL: 2-4 TIMES A MONTH
HOW OFTEN DO YOU HAVE SIX OR MORE DRINKS ON ONE OCCASION: LESS THAN MONTHLY
DO YOU BELONG TO ANY CLUBS OR ORGANIZATIONS SUCH AS CHURCH GROUPS UNIONS, FRATERNAL OR ATHLETIC GROUPS, OR SCHOOL GROUPS?: NO
HOW HARD IS IT FOR YOU TO PAY FOR THE VERY BASICS LIKE FOOD, HOUSING, MEDICAL CARE, AND HEATING?: NOT VERY HARD
HOW OFTEN DO YOU GET TOGETHER WITH FRIENDS OR RELATIVES?: ONCE A WEEK
HOW OFTEN DO YOU ATTEND CHURCH OR RELIGIOUS SERVICES?: NEVER
IN A TYPICAL WEEK, HOW MANY TIMES DO YOU TALK ON THE PHONE WITH FAMILY, FRIENDS, OR NEIGHBORS?: ONCE A WEEK
WITHIN THE PAST 12 MONTHS, YOU WORRIED THAT YOUR FOOD WOULD RUN OUT BEFORE YOU GOT THE MONEY TO BUY MORE: NEVER TRUE
HOW MANY DRINKS CONTAINING ALCOHOL DO YOU HAVE ON A TYPICAL DAY WHEN YOU ARE DRINKING: 3 OR 4
HOW OFTEN DO YOU ATTENT MEETINGS OF THE CLUB OR ORGANIZATION YOU BELONG TO?: NEVER

## 2023-01-04 ASSESSMENT — FIBROSIS 4 INDEX: FIB4 SCORE: 0.49

## 2023-01-04 ASSESSMENT — PATIENT HEALTH QUESTIONNAIRE - PHQ9: CLINICAL INTERPRETATION OF PHQ2 SCORE: 0

## 2023-01-04 ASSESSMENT — PAIN SCALES - GENERAL: PAINLEVEL: NO PAIN

## 2023-01-04 NOTE — ASSESSMENT & PLAN NOTE
Exercise induced asthma since childhood.  Worsened with viral illnesses.  Uses albuterol as needed. Usually takes years to get through an inhaler.

## 2023-01-04 NOTE — ASSESSMENT & PLAN NOTE
Concerned he may have ADHD as he has difficulty concentrating, staying on task and organizing thoughts.

## 2023-01-04 NOTE — PROGRESS NOTES
"Subjective     Dalton Farias is a 37 y.o. male who presents with Annual Exam            HPI  Exercise-induced asthma  Exercise induced asthma since childhood.  Worsened with viral illnesses.  Uses albuterol as needed. Usually takes years to get through an inhaler.    Difficulty concentrating  Concerned he may have ADHD as he has difficulty concentrating, staying on task and organizing thoughts.      Recent weight gain  Was exercising 5-6 days/week and now 1-2 related to change in work hours and low motivation.  Attention worse with less exercise.  15lb change over 11 months.  Spoke with psychiatrist about lack of motivation last year. Diet has also had an increase in carbs.     ROS  See HPI    Allergies   Allergen Reactions    Sulfa Drugs Unspecified     Current Outpatient Medications on File Prior to Visit   Medication Sig Dispense Refill    beclomethasone (BECONASE-AQ) 42 MCG/SPRAY nasal spray Administer 2 Sprays into affected nostril(S) 2 times a day.      IBUPROFEN PO Take  by mouth.      multivitamin (THERAGRAN) Tab Take 1 Tablet by mouth every day.      Misc Natural Products (JOINT SUPPORT PO) Take  by mouth.       No current facility-administered medications on file prior to visit.              Objective     /78 (BP Location: Left arm, Patient Position: Sitting, BP Cuff Size: Large adult)   Pulse 77   Temp 36.8 °C (98.3 °F) (Temporal)   Resp 14   Ht 1.88 m (6' 2\")   Wt 108 kg (238 lb)   SpO2 95%   BMI 30.56 kg/m²      Physical Exam  Constitutional:       General: He is not in acute distress.     Appearance: Normal appearance. He is not ill-appearing, toxic-appearing or diaphoretic.   HENT:      Head: Normocephalic and atraumatic.   Eyes:      General: No scleral icterus.     Pupils: Pupils are equal, round, and reactive to light.   Cardiovascular:      Rate and Rhythm: Normal rate and regular rhythm.      Heart sounds: Normal heart sounds.   Pulmonary:      Effort: Pulmonary effort is " normal.      Breath sounds: Normal breath sounds.   Musculoskeletal:      Cervical back: No rigidity.   Lymphadenopathy:      Cervical: No cervical adenopathy.   Skin:     General: Skin is warm and dry.   Neurological:      Mental Status: He is alert and oriented to person, place, and time.   Psychiatric:         Mood and Affect: Mood normal.         Behavior: Behavior normal.         Thought Content: Thought content normal.         Judgment: Judgment normal.          ADHD DSMV  Inattention: 4.5/9  Hyperactivity: 5/9  Affects >1 area of life                Assessment & Plan      1. Exercise-induced asthma  Chronic, controlled.  Continue albuterol 2 puffs every 6 hours as needed for shortness of breath related to exercise.    - Albuterol Sulfate 108 (90 Base) MCG/ACT AEROSOL POWDER, BREATH ACTIVATED; Take 2 Puffs by mouth every 6 hours as needed (for shortness of breath/wheeezing).  Dispense: 1 Each; Refill: 1    2. Difficulty concentrating  Chronic, uncontrolled.  Reviewed DSM-V criteria and Dalton appears to be on the cusp of both hyperactivity and attention deficit.  Referral placed to psychiatry for more in-depth evaluation.  Referral to therapy also placed.    - Referral to Psychiatry  - Referral to Behavioral Health    3. Recent weight gain  Chronic, uncontrolled.  We discussed that the goal is to exercise 150 minutes a week with an elevated heart rate.  We also discussed healthy eating habits including fruits, vegetables, nuts, lean meats.  He verbalized understanding and will work on reforming his lifestyle choices.    Return if symptoms worsen or fail to improve, for annual/wellness.    The patient verbalized agreement and understanding of the current plan. All questions and concerns were addressed at the time of visit.    This note was dictated using Dragon Software.  I have made every reasonable attempt to correct errors, but errors of grammar and content may still exist.

## 2023-01-04 NOTE — ASSESSMENT & PLAN NOTE
Was exercising 5-6 days/week and now 1-2 related to change in work hours and low motivation.  Attention worse with less exercise.  15lb change over 11 months.  Spoke with psychiatrist about lack of motivation last year. Diet has also had an increase in carbs.

## 2023-07-31 ENCOUNTER — OFFICE VISIT (OUTPATIENT)
Dept: BEHAVIORAL HEALTH | Facility: PSYCHIATRIC FACILITY | Age: 38
End: 2023-07-31
Payer: COMMERCIAL

## 2023-07-31 VITALS
HEIGHT: 74 IN | BODY MASS INDEX: 32.34 KG/M2 | WEIGHT: 252 LBS | HEART RATE: 67 BPM | DIASTOLIC BLOOD PRESSURE: 84 MMHG | OXYGEN SATURATION: 97 % | SYSTOLIC BLOOD PRESSURE: 136 MMHG

## 2023-07-31 DIAGNOSIS — Z86.59 HISTORY OF MAJOR DEPRESSION: ICD-10-CM

## 2023-07-31 DIAGNOSIS — F41.9 ANXIETY DISORDER, UNSPECIFIED TYPE: ICD-10-CM

## 2023-07-31 DIAGNOSIS — F90.2 ATTENTION DEFICIT HYPERACTIVITY DISORDER (ADHD), COMBINED TYPE, MILD: ICD-10-CM

## 2023-07-31 PROCEDURE — 3079F DIAST BP 80-89 MM HG: CPT

## 2023-07-31 PROCEDURE — 90792 PSYCH DIAG EVAL W/MED SRVCS: CPT

## 2023-07-31 PROCEDURE — 3075F SYST BP GE 130 - 139MM HG: CPT

## 2023-07-31 ASSESSMENT — FIBROSIS 4 INDEX: FIB4 SCORE: 0.49

## 2023-07-31 NOTE — PROGRESS NOTES
"[facility] Psychiatric Evaluation     Evaluation completed by: Joe Arenas M.D.   Date of Service: 07/31/2023   Appointment type: in-office appointment.    Information below was collected from: patient    Special language or communication needs: No  Responded to any questions about patient rights: N/a  Reviewed limits of confidentiality: Yes, reviewed  Confidentiality: The patient was informed that his medical records are confidential except for use by the treatment team in this clinic and others involved in his care.  Records may be shared with outside entities if the patient signs a release of information.  Information may be shared with appropriate authorities without a release of information to report instances of child/elder abuse or if it is determined he is in imminent risk of harm to self or others.     CHIEF COMPLAINT  \"Evaluation for ADHD\"    HISTORY OF PRESENT ILLNESS  Patient is a 37 y.o. old who presents today for initial psychiatric evaluation for assessment of ADHD. He has no previous mental health diagnosis but did previously see a psychiatrist for depression. He is not currently feeling depressed, but reports intermittent history of depression. He describes depression as difficulty sleeping and restlessness, passive thoughts about suicide with no plans, being very hard on himself and feeling like a failure and seeing negatives in himself, has more difficulty concentrating and focusing on top of baseline level of difficulty concentrating on un-stimulating tasks. The time a year ago he felt depressed for 6 weeks was the worst time, and hadn't noticed it before then. He reports that by the time he got in to see the psychiatrist the depression had already substantially improved.    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 He does not feel like he has difficulty concentrating or focusing on the job since it's so fast-paced, and " reports he doesn't make more mistakes than would be expected. He works around 50-60 hours a week.     He reports that he has several friends with ADHD and that he feels some of his experiences are consistent with what they describe, and would like evaluation for if ADHD helps explain his experiences and if so what might be helpful. He reports difficulty concentrating when doing boring or repetitive tasks, losing or misplacing items, his responses on the ASRS ADHD screener which is documented under media for today as ADHD symptoms checklist are very consistent with ADHD. When reviewing these responses he emphasized that many of them are longstanding things which impacted his childhood and time in school, and that they were just never formally evaluated. He reports he is open to medication or ADHD management strategy focused approaches as recommended from evaluation.      PSYCHIATRIC REVIEW OF SYSTEMS  Depression: As described above.  Anxiety: General worry about safety of family that is typically helpful, but occassionally works out to deal with this   Panic: He said he has had intense bouts of sudden off and on, most recent a month ago. Used to be weekly because dreaded going to work. These are more intense anxiety feelings that subside when he forces himself to do his normal routine.   OCD: Denies intrustive thoughts or compulsions  PTSD: Denies having been in life threating situations or abuse, says worst thing that has happened to him is someone telling him he's not good enough  N[place]omi: Longest he has gone without any sleep is 2 or 3 days when busy working with tight deadlines, and he would feel tired when that was happening. He would have waves of fatigue during those times.  Psychosis: Denies AVH most times, one time when he was really tired in 2019 he saw a kangaroo hopping across freeway and felt tired. This is not normal for him. Denies delusions or beliefs other people find unusual.  ADHD: As described above  in HPI  Sleep: On regular nights he sleeps 4-5 hours for years and doesn't feel tired. He used to sleep 8-10 hours. He usually falls asleep 10-11pm with some variability, 10pm to 1am with time of sleep onset ana randomly in there.    MEDICAL REVIEW OF SYSTEMS  Cardiac: reports during episodes of anxiety can feel heart beating, but does not have elevated heartrate when measured.  Resp: reports some shortness of breath when anxious  GI: Reports no changes with anxiety  : reports no changes with anxiety  Endocrine: reports no sweatiness or turning red with anxiety.    CURRENT MEDICATIONS  No psychiatric medications  Current Outpatient Medications on File Prior to Visit   Medication Sig Dispense Refill    Albuterol Sulfate 108 (90 Base) MCG/ACT AEROSOL POWDER, BREATH ACTIVATED Take 2 Puffs by mouth every 6 hours as needed (for shortness of breath/wheeezing). 1 Each 1    beclomethasone (BECONASE-AQ) 42 MCG/SPRAY nasal spray Administer 2 Sprays into affected nostril(S) 2 times a day.      IBUPROFEN PO Take  by mouth.      multivitamin (THERAGRAN) Tab Take 1 Tablet by mouth every day.      Misc Natural Products (JOINT SUPPORT PO) Take  by mouth.       No current facility-administered medications on file prior to visit.        ALLERGIES  Allergies   Allergen Reactions    Sulfa Drugs Unspecified        PAST PSYCHIATRIC HISTORY  Diagnoses: No diagnoses    Self Harm/Suicide Attempts: None    Past Hospitalizations:   No psychiatric hospitalizations.    Past Outpatient Treatment:  Therapy: Around 10 years ago went to marriage counseling.  Medication Management: Around a year ago went to a psychiatrist for depression, but by the time he got to see psychiatrist the depression had resolved.    Past Psychiatric Medications:  Patient reports he has not taken psychiatric medications    SOCIAL HISTORY  Childhood: Born in [facility] and describes childhood as normal, living with both parents who were  and going to public  school. Has older sister, was invovled in [employer].  Education: He went to school at [employer] and got a degree in construction management.  Employment:   Relationships/Family: He has been  for 10 years, and says his marriage is going well, although it has its ups and downs.   Current living situation: He lives in a house with his wife and 2 kids, and his wife's parents are sometimes there. His kids are 3 and 7, named Sara and Kira.   Legal: A couple of speeding tickets, no other problems  Abuse: Denies ever experiencing verbal, physical, emotional, sexual abuse  : never part of the miliary  Spirituality/Amish: spiritual, does not go to any spiritual groups    SUBSTANCE USE HISTORY  Alcohol: Socially, drinks around 1-2 drinks a week (either whisky or beer or wine). He has more (4-5 drinks) if having friends over or having a party once every few months, but not regularly.  Tobacco: He uses nicotine pouches (Zyn brand) and has used for past 8 months, and had previously quit smoking 12 years ago and quit chewing tobacco around 8 years ago after getting laid off from previous employer.  Cannabis: Uses THC around once a week, taking around 10mg on a weekend.  Opioids: reports none  Prescription medications: reports no recreational use  Other: reports no other recreational drug use  History of inpatient/outpatient rehab treatment: none    MEDICAL HISTORY     Past Medical History:   Diagnosis Date    Allergy     seasonal      Past Surgical History:   Procedure Laterality Date    NASAL POLYPECTOMY  2019    OTHER ORTHOPEDIC SURGERY      left hand surgery-1st, nerve repair, 2nd trigger finger    OTHER ORTHOPEDIC SURGERY      right knee        FAMILY HISTORY  Family History   Problem Relation Age of Onset    Obesity Mother     Obesity Father     No Known Problems Sister     Cancer Maternal Grandmother         breast     Alcohol abuse Maternal Grandfather     Lung Disease Maternal Grandfather          "smoker    Lung Disease Paternal Grandmother         smoked    Alcohol abuse Paternal Grandmother     No Known Problems Paternal Grandfather     Diabetes Neg Hx    Thinks sister may have mental illness, he thinks she has been seeing a doctor and might have depression.      PHYSICAL EXAMINATION  Vital signs: /84 (BP Location: Left arm, Patient Position: Sitting, BP Cuff Size: Adult)   Pulse 67   Ht 1.88 m (6' 2\")   Wt 114 kg (252 lb)   SpO2 97%   BMI 32.35 kg/m²   Musculoskeletal: Gait is normal. No gross abnormalities noted.   Abnormal movements: none noted    MENTAL STATUS EXAMINATION    General: appears stated age and exhibits grooming which is appropriate and casual.  Hygiene is appropriate.     Behavior: Pt is calm and cooperative with interview.  No apparent distress.  Eye contact is appropriate.  Psychomotor: Psychomotor agitation or retardation not noted.  Tics or tremors not noted.  Speech: rate within normal limits and volume within normal limits  Language: fluent English  Mood: \"pretty good\"  Affect: Flexible, Full range, and Congruent with content,  Thought Process: Logical and Goal-directed  Thought Content: denies suicidal ideation, denies homicidal ideation. Within normal limits  Perception: denies auditory hallucinations, denies visual hallucinations. No delusions noted on interview.    Attention span and concentration: sufficient for interview conversation  Orientation: Alert and Fully Oriented  Recent and remote memory: No gross evidence of memory deficits  Insight: Good  Judgment: Good      SAFETY ASSESSMENT - RISK TO SELF  Current suicide attempts or self harm: No  Past suicide attempts or self harm: No  History of suicide by family member: No  History of suicide by friend/significant other: No, although wife's brother attempted suicide by slitting wrists.  Recent change in amount/specificity/intensity of suicidal thoughts or self-harm behavior: No  Ongoing substance use disorder: " No  Current access to firearms, medications, or other identified means of suicide/self-harm: Yes  If yes, willing to restrict access to means of suicide/self-harm: N/a  Protective factors present: Yes     SAFETY ASSESSMENT - RISK TO OTHERS  Current aggressive behavior or risk to others: No  Past aggressive behavior or risk to others: No  Recent change in amount/specificity/intensity of thoughts or threats to harm others? No  Current access to firearms/other identified means of harm? Yes  If yes, willing to restrict access to weapons/means of harm? N/a     CURRENT RISK ASSESSMENT       Suicide: Low       Homicide: Low       Self-Harm: Low       Relapse: Not applicable       Crisis Safety Plan Reviewed Not Indicated    [place]  records   reviewed.  No concerns about misuse of controlled substance.    ASSESSMENT  Patient is a 37 y.o. old presenting for initial evaluation of concern he may have ADHD. His reported symptoms, including ASRS ADHD self-assessment he filled out during appointment are scanned into patient's chart. His reported experiences and ASRS screener are consistent with mild ADHD that is not causing substantial functional impairment in his job but is associated with some frustration and difficulty in relationships and with staying organized and doing boring or repetitive tasks. Discussed risks and benefits of stimulant medications with Mr. Farias and he agreed with plan to focus initially on strategies for managing ADHD rather than starting a medication at this time. Plan to see him again in a month and work on psychotherapeutic ADHD management strategies at that time and continue to assess if a medication would be helpful. He also described symptoms consistent with intermittent anxiety, and further discussion of these can be helpful for generating a management plan in future appointments.    DIAGNOSES/PLAN  Problem 1: ADHD, mild  Medications: None  Psychotherapy: none, plan to start incorporating  therapy techniques into future appointments  Labs/studies: none at this time    Problem 2: Unspecified anxiety disorder  Medications: None  Psychotherapy: none at this time  Labs/studies: none at this time    Problem 3: History of major depressive episode  Medications: None  Psychotherapy:  none at this time  Labs/studies:  none at this time    No problem-specific Assessment & Plan notes found for this encounter.       Medication options, alternatives (including no medications) and medication risks/benefits/side effects were discussed in detail.  The patient was advised to call, message clinician on [employer], or come in to the clinic if symptoms worsen or if questions/issues regarding their medications arise.  The patient verbalized understanding and agreement.    The patient was educated to call 911, call the suicide hotline, or go to the local ER if having thoughts of suicide or homicide.  The patient verbalized understanding and agreement.   The proposed treatment plan was discussed with the patient who was provided the opportunity to ask questions and make suggestions regarding alternative treatment. Patient verbalized understanding and expressed agreement with the plan.      Return to clinic in 1 month or sooner if symptoms worsen.    This appointment was supervised by attending psychiatrist. See attending attestation for more details.       Joe Arenas M.D.

## 2023-08-28 ENCOUNTER — OFFICE VISIT (OUTPATIENT)
Dept: BEHAVIORAL HEALTH | Facility: PSYCHIATRIC FACILITY | Age: 38
End: 2023-08-28
Payer: COMMERCIAL

## 2023-08-28 VITALS
HEART RATE: 77 BPM | DIASTOLIC BLOOD PRESSURE: 73 MMHG | BODY MASS INDEX: 32.34 KG/M2 | SYSTOLIC BLOOD PRESSURE: 129 MMHG | HEIGHT: 74 IN | WEIGHT: 252 LBS | OXYGEN SATURATION: 97 %

## 2023-08-28 DIAGNOSIS — F90.2 ADHD (ATTENTION DEFICIT HYPERACTIVITY DISORDER), COMBINED TYPE: ICD-10-CM

## 2023-08-28 PROCEDURE — 3078F DIAST BP <80 MM HG: CPT

## 2023-08-28 PROCEDURE — 3074F SYST BP LT 130 MM HG: CPT

## 2023-08-28 PROCEDURE — 99213 OFFICE O/P EST LOW 20 MIN: CPT | Mod: GE

## 2023-08-28 RX ORDER — DEXTROAMPHETAMINE SACCHARATE, AMPHETAMINE ASPARTATE MONOHYDRATE, DEXTROAMPHETAMINE SULFATE AND AMPHETAMINE SULFATE 2.5; 2.5; 2.5; 2.5 MG/1; MG/1; MG/1; MG/1
10 CAPSULE, EXTENDED RELEASE ORAL EVERY MORNING
Qty: 30 CAPSULE | Refills: 0 | Status: SHIPPED | OUTPATIENT
Start: 2023-08-28 | End: 2023-09-25 | Stop reason: SDUPTHER

## 2023-08-28 ASSESSMENT — FIBROSIS 4 INDEX: FIB4 SCORE: 0.49

## 2023-08-28 NOTE — PROGRESS NOTES
[facility]  Medication Management Note    Evaluation completed by: Joe Arenas M.D.   Date of Service: 08/28/2023  Appointment type: in-office appointment.    Information below was collected from: patient    Special language or communication needs: No  Responded to any questions about patient rights: N/a  Reviewed limits of confidentiality: N/a  Confidentiality: The patient was informed that his medical records are confidential except for use by the treatment team in this clinic and others involved in his care.  Records may be shared with outside entities if the patient signs a release of information.  Information may be shared with appropriate authorities without a release of information to report instances of child/elder abuse or if it is determined he is in imminent risk of harm to self or others.     CHIEF COMPLAINT/REASON FOR VISIT  Difficulty concentrating    HISTORY OF PRESENT ILLNESS  Mr. Farias is a 37 y.o. old who presents today for follow up management of ADHD.  Pt was last seen on 7/31/2023, at which time the plan was to further discuss ADHD assessment at his next appointment. The ASRS ADHD assessment he filled out at his previous appointment was reviewed with him in session and responses to different items discussed. He reported difficulty with waiting until the last minute to do projects, and that when he has things scheduled that it is helpful. He also reported a longstanding history of fidgeting, that he has people read over and review emails or other documents several times a week to make sure there aren't errors since he has difficulty concentrating and will find himself making small errors persistently with things, and has trouble with losing and misplacing things such as his wallet. He discussed his desk being less organize than other coworkers. He further discussed difficulties with entering things into spreadsheets an making errors when doing so and having  challenges that relate to both organization and immediate focus in varying degrees.Mr. Farias expressed that he is open to doing both psychotherapeutic techniques for ADHD and to starting a medication which can help more immediately with concentration. The risks and benefits of using stimulant medication were discussed with Mr. Farias.    PSYCHOSOCIAL CHANGES SINCE LAST VISIT   No major changes since last visit    CURRENT MEDICATIONS, ADHERENCE, AND SIDE EFFECTS   Not currently taking psychiatric medication    CURRENT MEDICATIONS  Current Outpatient Medications on File Prior to Visit   Medication Sig Dispense Refill    Albuterol Sulfate 108 (90 Base) MCG/ACT AEROSOL POWDER, BREATH ACTIVATED Take 2 Puffs by mouth every 6 hours as needed (for shortness of breath/wheeezing). 1 Each 1    beclomethasone (BECONASE-AQ) 42 MCG/SPRAY nasal spray Administer 2 Sprays into affected nostril(S) 2 times a day.      IBUPROFEN PO Take  by mouth.      multivitamin (THERAGRAN) Tab Take 1 Tablet by mouth every day.      Misc Natural Products (JOINT SUPPORT PO) Take  by mouth.       No current facility-administered medications on file prior to visit.       ALLERGIES  Allergies   Allergen Reactions    Sulfa Drugs Unspecified        PAST PSYCHIATRIC HISTORY  Per intake appointment from 7/31:  Diagnoses: No diagnoses     Self Harm/Suicide Attempts: None     Past Hospitalizations:   No psychiatric hospitalizations.     Past Outpatient Treatment:  Therapy: Around 10 years ago went to marriage counseling.  Medication Management: Around a year ago went to a psychiatrist for depression, but by the time he got to see psychiatrist the depression had resolved.     Past Psychiatric Medications:  Patient reports he has not taken psychiatric medications    SOCIAL HISTORY SUMMARY  Per intake appointment from 7/31:  normal, living with both parents who were  and going to public school. Has older sister, was invovled in [employer].  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  Employment:   Relationships/Family: He has been  for 10 years, and says his marriage is going well, although it has its ups and downs.   Current living situation: He lives in a house with his wife and 2 kids, and his wife's parents are sometimes there. His kids are 3 and 7, named Sara and Kira.   Legal: A couple of speeding tickets, no other problems  Abuse: Denies ever experiencing verbal, physical, emotional, sexual abuse  : never part of the miliary  Spirituality/Alevism: spiritual, does not go to any spiritual groups    SUBSTANCE USE HISTORY  Per intake appointment from 7/31:  Alcohol: Socially, drinks around 1-2 drinks a week (either whisky or beer or wine). He has more (4-5 drinks) if having friends over or having a party once every few months, but not regularly.  Tobacco: He uses nicotine pouches (Zyn brand) and has used for past 8 months, and had previously quit smoking 12 years ago and quit chewing tobacco around 8 years ago after getting laid off from previous employer.  Cannabis: Uses THC around once a week, taking around 10mg on a weekend.  Opioids: reports none  Prescription medications: reports no recreational use  Other: reports no other recreational drug use  History of inpatient/outpatient rehab treatment: none    MEDICAL HISTORY  Past Medical History:   Diagnosis Date    Allergy     seasonal     Past Surgical History:   Procedure Laterality Date    NASAL POLYPECTOMY  2019    OTHER ORTHOPEDIC SURGERY      left hand surgery-1st, nerve repair, 2nd trigger finger    OTHER ORTHOPEDIC SURGERY      right knee        FAMILY HISTORY  Family History   Problem Relation Age of Onset    Obesity Mother     Obesity Father     No Known Problems Sister     Cancer Maternal Grandmother         breast     Alcohol abuse Maternal Grandfather     Lung Disease Maternal Grandfather         smoker    Lung Disease Paternal  "Grandmother         smoked    Alcohol abuse Paternal Grandmother     No Known Problems Paternal Grandfather     Diabetes Neg Hx        PHYSICAL EXAMINATION  Vital signs: /73 (BP Location: Left arm, Patient Position: Sitting, BP Cuff Size: Adult)   Pulse 77   Ht 1.88 m (6' 2\")   Wt 114 kg (252 lb)   SpO2 97%   BMI 32.35 kg/m²   Musculoskeletal: Gait is normal. No gross abnormalities noted.   Abnormal movements: not noted    MENTAL STATUS EXAMINATION    General: Appears stated age and exhibits grooming which is appropriate and casual.  Hygiene is good.     Behavior: Pt is calm and cooperative with interview.  No apparent distress.  Eye contact is appropriate.   Psychomotor: Psychomotor agitation or retardation not noted.  Tics or tremors not noted.  Speech: rate within normal limits and volume within normal limits  Language: fluent English  Mood: \"better\"  Affect: Flexible and Full range,  Thought Process: Logical and Goal-directed  Thought Content: No evidence of SI, HI, or AVH.  Attention span and concentration: sufficient for interview  Orientation: Alert  Recent and remote memory: No gross evidence of memory deficits  Insight: Good  Judgment: Good    SAFETY ASSESSMENT - RISK TO SELF  Current suicide attempts or self harm: No  Past suicide attempts or self harm: No  History of suicide by family member: No  History of suicide by friend/significant other: No  Ongoing substance use disorder: No  Current access to firearms, medications, or other identified means of suicide/self-harm: Yes  If yes, willing to restrict access to means of suicide/self-harm: N/a  Protective factors present: Yes     SAFETY ASSESSMENT - RISK TO OTHERS  Current aggressive behavior or risk to others: No  Past aggressive behavior or risk to others: No  Recent change in amount/specificity/intensity of thoughts or threats to harm others? No  Current access to firearms/other identified means of harm? Yes  If yes, willing to restrict " "access to weapons/means of harm? N/a     CURRENT RISK ASSESSMENT       Suicide: Low       Homicide: Low       Self-Harm: Low       Relapse: Not applicable       Crisis Safety Plan Reviewed Not Indicated    [place]   [place].  No concerns about misuse of controlled substance.    ASSESSMENT  Mr. Farias is a 37 y.o. old presenting for follow up assessment and management of ADHD. He as a challenging job and has learned to function well and to use strategies like having a coworker proofread important documents before sending which can help with not having difficulty with concentration impact his job as much. The symptoms he described today regarding his answers to the ASRS assessment at the last appointment are consistent with longstanding difficulty with concentration on immediate tasks that leads to small errors, and because problems like these may be responsive to medication starting on a low dose of Adderall XR can likely be beneficial. For problems that can be improved with scheduling and organization techniques, use of psychotherapeutic approaches can likely be beneficial. A combined approach of stimulants with greater organizational and scheduling focus is may help with reaching the best improvements in ADHD symptoms.    Biopsychosocial Formulation:      DIAGNOSES/PLAN  Problem 1:  ADHD  Status: Unchanged  Medications: Start Adderall XR 10mg qAM for ADHD  Psychotherapy: none, but recommended patient read the book \"Thriving with Adult ADHD\" to learn techniques for improving ADHD symptoms.  Labs/studies: none at this time  Other: Recommended increased use of scheduling and having a consistent place to put things to keep track of them.    Problem 2: Unspecified anxiety disorder  Status: Unchanged  Medications:  None  Psychotherapy:  none at this time  Labs/studies: none at this time    Problem 3: History of major depressive episode  Status: Unchanged  Medications:  None  Psychotherapy: none at this " time  Labs/studies: none at this time    Medication options, alternatives (including no medications) and medication risks/benefits/side effects were discussed in detail.  The proposed treatment plan was discussed with the patient who was provided the opportunity to ask questions and make suggestions regarding alternative treatment. Patient verbalized understanding and expressed agreement with the plan.      Return to clinic in 1 month or sooner if symptoms worsen.    This appointment was supervised by attending psychiatrist.  See attending attestation for more details.       Joe Arenas M.D.

## 2023-09-25 ENCOUNTER — OFFICE VISIT (OUTPATIENT)
Dept: BEHAVIORAL HEALTH | Facility: PSYCHIATRIC FACILITY | Age: 38
End: 2023-09-25
Payer: COMMERCIAL

## 2023-09-25 DIAGNOSIS — F90.2 ADHD (ATTENTION DEFICIT HYPERACTIVITY DISORDER), COMBINED TYPE: ICD-10-CM

## 2023-09-25 DIAGNOSIS — F41.9 ANXIETY DISORDER, UNSPECIFIED TYPE: ICD-10-CM

## 2023-09-25 PROCEDURE — 99213 OFFICE O/P EST LOW 20 MIN: CPT | Mod: GE

## 2023-09-25 RX ORDER — DEXTROAMPHETAMINE SACCHARATE, AMPHETAMINE ASPARTATE MONOHYDRATE, DEXTROAMPHETAMINE SULFATE AND AMPHETAMINE SULFATE 2.5; 2.5; 2.5; 2.5 MG/1; MG/1; MG/1; MG/1
10 CAPSULE, EXTENDED RELEASE ORAL EVERY MORNING
Qty: 30 CAPSULE | Refills: 0 | Status: SHIPPED | OUTPATIENT
Start: 2023-09-25 | End: 2023-10-25

## 2023-09-25 NOTE — PROGRESS NOTES
"[facility]  Medication Management Note    Evaluation completed by: Joe Arenas M.D.   Date of Service: 09/25/2023  Appointment type: in-office appointment.  Information below was collected from: patient  Special language or communication needs: No  Responded to any questions about patient rights: N/a  Reviewed limits of confidentiality: N/a  Confidentiality: The patient has been informed that his medical records are confidential except for use by the treatment team in this clinic and others involved in his care.  Records may be shared with outside entities if the patient signs a release of information.  Information may be shared with appropriate authorities without a release of information to report instances of child/elder abuse or if it is determined he is in imminent risk of harm to self or others.     SUBJECTIVE  Patient is a 37 y.o. old who presents today for follow up management of ADHD.   Pt was last seen on 8/28/2023, at which time the plan was to start Adderall XR 10mg and for him to get and start reading the book \"Thriving with Adult ADHD\".      He reports the fist week on the meds was an adjustment, and that he initially felt ana foggy and out of it which has resolved with no other side effects besides some stomach pain and nausea if he takes it on an empty stomach. He reports that other than this, he has been paying a bit more attention and having a bit more focus than usual with the medication and would therefore like to continue it. He reports he has been working on scheduling and blocking things out, and putting his to-do list on his computer which has been effective.He reports he got the book, and hasn't started reading it.     He reports no feelings of being down or depressed, and has some fast paced deadlines at work that are stressful. He reports his sleep hasn't been great recently and this has been a fluctuating issue, and thinks he might have a bit more " restlessness and that his sleep onset time has become more variable since he's staying up waking up early to work on the work project. He reports it is often around midnight he falls asleep, and that he often wakes up around 5am. He reports he would normally have some tiredness with 6 hours of sleep, and feel groggy if he got more than 8 hours of sleep. He is still having around 2-4 cups of coffee a day throughout the day, with the latest time being around 3 or 4PM in the afternoon but not always typical.    PSYCHOSOCIAL CHANGES SINCE LAST VISIT   Increased work stress and fast paced project deadlines as described above in subjective.    CURRENT MEDICATIONS, ADHERENCE, AND SIDE EFFECTS   Adderall XR 10mg, taking routinely as prescribed, some mild tolerable side effects as described above in the subjective    CURRENT MEDICATIONS  Current Outpatient Medications on File Prior to Visit   Medication Sig Dispense Refill    amphetamine-dextroamphetamine XR (ADDERALL XR, 10MG,) 10 MG CAPSULE SR 24 HR Take 1 Capsule by mouth every morning for 30 days. 30 Capsule 0    Albuterol Sulfate 108 (90 Base) MCG/ACT AEROSOL POWDER, BREATH ACTIVATED Take 2 Puffs by mouth every 6 hours as needed (for shortness of breath/wheeezing). 1 Each 1    beclomethasone (BECONASE-AQ) 42 MCG/SPRAY nasal spray Administer 2 Sprays into affected nostril(S) 2 times a day.      IBUPROFEN PO Take  by mouth.      multivitamin (THERAGRAN) Tab Take 1 Tablet by mouth every day.      Misc Natural Products (JOINT SUPPORT PO) Take  by mouth.       No current facility-administered medications on file prior to visit.       ALLERGIES  Allergies   Allergen Reactions    Sulfa Drugs Unspecified        PAST PSYCHIATRIC HISTORY  Per intake appointment from 7/31/23:  Diagnoses: No diagnoses     Self Harm/Suicide Attempts: None     Past Hospitalizations:   No psychiatric hospitalizations.     Past Outpatient Treatment:  Therapy: Around 10 years ago went to marriage  counseling.  Medication Management: Around a year ago went to a psychiatrist for depression, but by the time he got to see psychiatrist the depression had resolved.     Past Psychiatric Medications:  Patient reports he has not taken psychiatric medications    SOCIAL HISTORY SUMMARY  Per intake appointment from 7/31/23:  Childhood: Born in [facility] and describes childhood as normal, living with both parents who were  and going to public school. Has older sister, was invovled in [employer].  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  Employment:   Relationships/Family: He has been  for 10 years, and says his marriage is going well, although it has its ups and downs.   Current living situation: He lives in a house with his wife and 2 kids, and his wife's parents are sometimes there. His kids are 3 and 7, named Sara and Kira.   Legal: A couple of speeding tickets, no other problems  Abuse: Denies ever experiencing verbal, physical, emotional, sexual abuse  : never part of the miliary  Spirituality/Rastafari: spiritual, does not go to any spiritual groups     MEDICAL HISTORY  Past Medical History:   Diagnosis Date    Allergy     seasonal     Past Surgical History:   Procedure Laterality Date    NASAL POLYPECTOMY  2019    OTHER ORTHOPEDIC SURGERY      left hand surgery-1st, nerve repair, 2nd trigger finger    OTHER ORTHOPEDIC SURGERY      right knee        FAMILY HISTORY  Family History   Problem Relation Age of Onset    Obesity Mother     Obesity Father     No Known Problems Sister     Cancer Maternal Grandmother         breast     Alcohol abuse Maternal Grandfather     Lung Disease Maternal Grandfather         smoker    Lung Disease Paternal Grandmother         smoked    Alcohol abuse Paternal Grandmother     No Known Problems Paternal Grandfather     Diabetes Neg Hx        PHYSICAL EXAMINATION  Vital signs: There were no vitals taken for this  "visit.  Musculoskeletal: Gait is normal. No gross abnormalities noted.   Abnormal movements: no tics or tremors observed.    MENTAL STATUS EXAMINATION    General: Appears stated age and exhibits grooming which is appropriate and neat.  Hygiene is good.     Behavior: Pt is calm and cooperative with interview.  No apparent distress.  Eye contact is appropriate.   Psychomotor: Psychomotor agitation or retardation not noted.  Tics or tremors not noted.  Speech: rate within normal limits and volume within normal limits  Language: fluent English  Mood: \"good\"  Affect: Flexible and Full range,  Thought Process: Logical and Goal-directed  Thought Content: No evidence of suicidal ideation, homicidal ideation, auditory hallucinations, or visual hallucinations. No delusions noted on interview.   Attention span and concentration: sufficient for interview  Orientation: Alert and Fully Oriented  Recent and remote memory: No gross evidence of memory deficits  Insight: Good  Judgment: Good    SAFETY ASSESSMENT - RISK TO SELF  Current suicide attempts or self harm: No  Past suicide attempts or self harm: No  History of suicide by family member: No  History of suicide by friend/significant other: No  Ongoing substance use disorder: No  Current access to firearms, medications, or other identified means of suicide/self-harm: Yes  If yes, willing to restrict access to means of suicide/self-harm: N/a  Protective factors present: Yes     SAFETY ASSESSMENT - RISK TO OTHERS  Current aggressive behavior or risk to others: No  Past aggressive behavior or risk to others: No  Recent change in amount/specificity/intensity of thoughts or threats to harm others? No  Current access to firearms/other identified means of harm? Yes  If yes, willing to restrict access to weapons/means of harm? N/a     CURRENT RISK ASSESSMENT       Suicide: Low       Homicide: Low       Self-Harm: Low       Relapse: Not applicable       Crisis Safety Plan Reviewed Not " "Indicated    [place]  records   reviewed.  No concerns about misuse of controlled substance.    ASSESSMENT  Mr. Farias is a 37 y.o. old presenting for follow up assessment and management of ADHD. Since last appointment he started adderall XR 10gm qAM as prescribed at the appointment and has seen some benefits from this which have been somewhat subtle as his ADHD has been fairly mild. Although he describes mild side effects like nausea and mild stomach pain if he takes it without food, he finds it tolerable and helpful overall so the benefits currently outweigh the risks. Continuing current dose is indicated for ADHD as it has helped with improving function. The importance of sleep regularity and working to fall asleep around the same time every night was discussed, as was cutting down on caffeine in the afternoon if it is impacting sleep. His current work stress is likely a contributor to mild decrease in amount of sleep from normal, and the importance of adequate sleep for concentration was emphasized as well. Based on his typical sleep schedule and when he feels well-rested, aiming for around 7 hours of sleep appears to be a good amount for him.    DIAGNOSES/PLAN  Problem 1:  ADHD  Status: Improved  Medications: Continue Adderall XR 10mg qAM for ADHD  Psychotherapy: none, but recommended patient start to read the book \"Thriving with Adult ADHD\" which he got a copy of when he has time to learn techniques for improving ADHD symptoms.  Labs/studies: none at this time  Other: Recommended continuing increased use of scheduling and having a consistent place to put things to keep track of them.    Problem 2: Unspecified anxiety disorder  Status: Unchanged  Medications:  None  Psychotherapy: none at this time  Labs/studies: none at this time    Problem 3: History of major depressive episode  Status: Unchanged  Medications:  None  Psychotherapy: none at this time  Labs/studies: none at this time    Medication options, " alternatives (including no medications) and medication risks/benefits/side effects were discussed in detail.  The patient was advised to call, message clinician on MyChart, or come in to the clinic if symptoms worsen or if questions/issues regarding their medications arise.  The patient verbalized understanding and agreement.    The proposed treatment plan was discussed with the patient who was provided the opportunity to ask questions and make suggestions regarding alternative treatment. Patient verbalized understanding and expressed agreement with the plan.      Return to clinic in 1 month or sooner if symptoms worsen.    This appointment was supervised by attending psychiatrist.  See attending attestation for more details.       Joe Arenas M.D.

## 2023-11-06 ENCOUNTER — OFFICE VISIT (OUTPATIENT)
Dept: BEHAVIORAL HEALTH | Facility: PSYCHIATRIC FACILITY | Age: 38
End: 2023-11-06
Payer: COMMERCIAL

## 2023-11-06 VITALS
BODY MASS INDEX: 31.7 KG/M2 | HEART RATE: 72 BPM | DIASTOLIC BLOOD PRESSURE: 73 MMHG | SYSTOLIC BLOOD PRESSURE: 122 MMHG | OXYGEN SATURATION: 96 % | WEIGHT: 247 LBS | HEIGHT: 74 IN

## 2023-11-06 DIAGNOSIS — F90.2 ADHD (ATTENTION DEFICIT HYPERACTIVITY DISORDER), COMBINED TYPE: ICD-10-CM

## 2023-11-06 PROCEDURE — 99213 OFFICE O/P EST LOW 20 MIN: CPT | Mod: GE

## 2023-11-06 PROCEDURE — 3074F SYST BP LT 130 MM HG: CPT

## 2023-11-06 PROCEDURE — 3078F DIAST BP <80 MM HG: CPT

## 2023-11-06 RX ORDER — DEXTROAMPHETAMINE SACCHARATE, AMPHETAMINE ASPARTATE MONOHYDRATE, DEXTROAMPHETAMINE SULFATE AND AMPHETAMINE SULFATE 3.75; 3.75; 3.75; 3.75 MG/1; MG/1; MG/1; MG/1
15 CAPSULE, EXTENDED RELEASE ORAL EVERY MORNING
Qty: 30 CAPSULE | Refills: 0 | Status: SHIPPED | OUTPATIENT
Start: 2023-11-06 | End: 2023-12-08 | Stop reason: SDUPTHER

## 2023-11-06 RX ORDER — DEXTROAMPHETAMINE SACCHARATE, AMPHETAMINE ASPARTATE MONOHYDRATE, DEXTROAMPHETAMINE SULFATE AND AMPHETAMINE SULFATE 2.5; 2.5; 2.5; 2.5 MG/1; MG/1; MG/1; MG/1
10 CAPSULE, EXTENDED RELEASE ORAL EVERY MORNING
Qty: 30 CAPSULE | Refills: 0 | Status: CANCELLED | OUTPATIENT
Start: 2023-11-06 | End: 2023-12-06

## 2023-11-06 ASSESSMENT — FIBROSIS 4 INDEX: FIB4 SCORE: 0.49

## 2023-11-06 NOTE — PROGRESS NOTES
"[facility]  Medication Management Note    Evaluation completed by: Joe Arenas M.D.   Date of Service: 11/06/2023  Appointment type: in-office appointment.  Information below was collected from: patient  Special language or communication needs: No  Responded to any questions about patient rights: N/a  Reviewed limits of confidentiality: N/a  Confidentiality: The patient has been informed that his medical records are confidential except for use by the treatment team in this clinic and others involved in his care.  Records may be shared with outside entities if the patient signs a release of information.  Information may be shared with appropriate authorities without a release of information to report instances of child/elder abuse or if it is determined he is in imminent risk of harm to self or others.     SUBJECTIVE  Patient is a 37 y.o. old who presents today for follow up management of ADHD.   Pt was last seen on 9/25/2023, at which time the plan was to continue adderall XR 10mg and for him to get and start reading the book \"Thriving with Adult ADHD\".      He describes things as good, but a \"little crazy\" as he is busy with work including going to [employer] for his job for a few days. He describes himself as pretty busy right now, and that he hasn't had a chance to start reading the book yet but is working out with a erich from work. He has worked to decrease afternoon caffeine intake in order to have better sleep. He reports marital difficulties with his wife but these are in the process of resolving and moving in a good direction. He reports he ran out of medication last Tuesday and hasn't noticed a huge difference off of the medication. He notices he does have better focus on the medication and feels more alert and engaged, and notices more side effects. He finds it more difficult to remember things without the medication/easier to forget to do small things. He describes no other " major life changes. When discussing dose of adderall and viability of either continuing 10mg or going up to 15mg since he has noticed differences but not massive differences with 10mg adderall xr, he expressed a preference for trying 15mg and following up at next appointment on how this is going to determine if that's a more optimal dose for him.     CURRENT MEDICATIONS, ADHERENCE, AND SIDE EFFECTS   Adderall XR 10mg, taking routinely as prescribed but ran out last week    CURRENT MEDICATIONS  Current Outpatient Medications on File Prior to Visit   Medication Sig Dispense Refill    Albuterol Sulfate 108 (90 Base) MCG/ACT AEROSOL POWDER, BREATH ACTIVATED Take 2 Puffs by mouth every 6 hours as needed (for shortness of breath/wheeezing). 1 Each 1    beclomethasone (BECONASE-AQ) 42 MCG/SPRAY nasal spray Administer 2 Sprays into affected nostril(S) 2 times a day.      IBUPROFEN PO Take  by mouth.      multivitamin (THERAGRAN) Tab Take 1 Tablet by mouth every day.      Misc Natural Products (JOINT SUPPORT PO) Take  by mouth.       No current facility-administered medications on file prior to visit.       ALLERGIES  Allergies   Allergen Reactions    Sulfa Drugs Unspecified        PAST PSYCHIATRIC HISTORY  Per intake appointment from 7/31/23:  Diagnoses: No diagnoses     Self Harm/Suicide Attempts: None     Past Hospitalizations:   No psychiatric hospitalizations.     Past Outpatient Treatment:  Therapy: Around 10 years ago went to marriage counseling.  Medication Management: Around a year ago went to a psychiatrist for depression, but by the time he got to see psychiatrist the depression had resolved.     Past Psychiatric Medications:  Patient reports he has not taken psychiatric medications    SOCIAL HISTORY SUMMARY  Per intake appointment from 7/31/23:  Childhood: Born in [facility] and describes childhood as normal, living with both parents who were  and going to public school. Has older sister, was invovled in  "boyscouts.  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  Employment:   Relationships/Family: He has been  for 10 years, and says his marriage is going well, although it has its ups and downs.   Current living situation: He lives in a house with his wife and 2 kids, and his wife's parents are sometimes there. His kids are 3 and 7, named Sara and Kira.   Legal: A couple of speeding tickets, no other problems  Abuse: Denies ever experiencing verbal, physical, emotional, sexual abuse  : never part of the [employer]  Spirituality/Orthodox: spiritual, does not go to any spiritual groups     MEDICAL HISTORY  Past Medical History:   Diagnosis Date    Allergy     seasonal     Past Surgical History:   Procedure Laterality Date    NASAL POLYPECTOMY  2019    OTHER ORTHOPEDIC SURGERY      left hand surgery-1st, nerve repair, 2nd trigger finger    OTHER ORTHOPEDIC SURGERY      right knee        FAMILY HISTORY  Family History   Problem Relation Age of Onset    Obesity Mother     Obesity Father     No Known Problems Sister     Cancer Maternal Grandmother         breast     Alcohol abuse Maternal Grandfather     Lung Disease Maternal Grandfather         smoker    Lung Disease Paternal Grandmother         smoked    Alcohol abuse Paternal Grandmother     No Known Problems Paternal Grandfather     Diabetes Neg Hx        PHYSICAL EXAMINATION  Vital signs: /73 (BP Location: Left arm, Patient Position: Sitting, BP Cuff Size: Adult)   Pulse 72   Ht 1.88 m (6' 2\")   Wt 112 kg (247 lb)   SpO2 96%   BMI 31.71 kg/m²   Musculoskeletal: Gait is normal. No gross abnormalities noted.   Abnormal movements: no tics or tremors observed.    MENTAL STATUS EXAMINATION    General: Appears stated age and exhibits grooming which is appropriate and neat.  Hygiene is good.     Behavior: Pt is calm and cooperative with interview.  No apparent distress.  Eye contact is appropriate. " "  Psychomotor: Psychomotor agitation or retardation not noted.  Tics or tremors not noted.  Speech: rate within normal limits and volume within normal limits  Language: fluent English  Mood: \"good\"  Affect: Flexible and Full range,  Thought Process: Logical and Goal-directed  Thought Content: No evidence of suicidal ideation, homicidal ideation, auditory hallucinations, or visual hallucinations. No delusions noted on interview.   Attention span and concentration: sufficient for interview  Orientation: Alert and Fully Oriented  Recent and remote memory: No gross evidence of memory deficits  Insight: Good  Judgment: Good    SAFETY ASSESSMENT - RISK TO SELF  Current suicide attempts or self harm: No  Past suicide attempts or self harm: No  History of suicide by family member: No  History of suicide by friend/significant other: No  Ongoing substance use disorder: No  Current access to firearms, medications, or other identified means of suicide/self-harm: Yes  If yes, willing to restrict access to means of suicide/self-harm: N/a  Protective factors present: Yes     SAFETY ASSESSMENT - RISK TO OTHERS  Current aggressive behavior or risk to others: No  Past aggressive behavior or risk to others: No  Recent change in amount/specificity/intensity of thoughts or threats to harm others? No  Current access to firearms/other identified means of harm? Yes  If yes, willing to restrict access to weapons/means of harm? N/a     CURRENT RISK ASSESSMENT       Suicide: Low       Homicide: Low       Self-Harm: Low       Relapse: Not applicable       Crisis Safety Plan Reviewed Not Indicated    [place]  records   reviewed.  No concerns about misuse of controlled substance.    ASSESSMENT  Mr. Farias is a 37 y.o. old presenting for follow up assessment and management of ADHD. Since last appointment took adderall XR 10gm qAM as prescribed although he ran out last week; he has seen some improvements with focus and memory on it but not " "massive changes. His life circumstances of a stressful and demanding job as well as some challenges in marital relationship add stress, but he has protective factors for mental health including working out with a erich, diminishing caffeine intake in afternoon, and working to have as regular of a sleep schedule as possible. Increasing to 15mg is merited to see if this gives him better management of ADHD symptoms, and can re-evaluate dose at next appointment.     DIAGNOSES/PLAN  Problem 1:  ADHD  Status: Improved  Medications: Increase Adderall XR to 15mg from 10mg qAM for ADHD  Psychotherapy: none, but recommended patient start to read the book \"Thriving with Adult ADHD\" when he gets the time which he got a copy of when he has time to learn techniques for improving ADHD symptoms.  Labs/studies: none at this time  Other: Previously recommended continuing increased use of scheduling and having a consistent place to put things to keep track of them.    Problem 2: Unspecified anxiety disorder  Status: Unchanged  Medications:  None  Psychotherapy: none at this time  Labs/studies: none at this time    Problem 3: History of major depressive episode  Status: Unchanged  Medications:  None  Psychotherapy: none at this time  Labs/studies: none at this time    Medication options, alternatives (including no medications) and medication risks/benefits/side effects were discussed in detail.  The patient was advised to call, message clinician on [employer], or come in to the clinic if symptoms worsen or if questions/issues regarding their medications arise.  The patient verbalized understanding and agreement.    The proposed treatment plan was discussed with the patient who was provided the opportunity to ask questions and make suggestions regarding alternative treatment. Patient verbalized understanding and expressed agreement with the plan.      Return to clinic in 1 month or sooner if symptoms worsen.    This appointment was " supervised by attending psychiatrist.  See attending attestation for more details.       Joe Arenas M.D.

## 2023-12-08 RX ORDER — DEXTROAMPHETAMINE SACCHARATE, AMPHETAMINE ASPARTATE MONOHYDRATE, DEXTROAMPHETAMINE SULFATE AND AMPHETAMINE SULFATE 3.75; 3.75; 3.75; 3.75 MG/1; MG/1; MG/1; MG/1
15 CAPSULE, EXTENDED RELEASE ORAL EVERY MORNING
Qty: 30 CAPSULE | Refills: 0 | Status: SHIPPED | OUTPATIENT
Start: 2023-12-08 | End: 2023-12-11 | Stop reason: SDUPTHER

## 2023-12-11 ENCOUNTER — OFFICE VISIT (OUTPATIENT)
Dept: BEHAVIORAL HEALTH | Facility: PSYCHIATRIC FACILITY | Age: 38
End: 2023-12-11
Payer: COMMERCIAL

## 2023-12-11 DIAGNOSIS — F90.2 ADHD (ATTENTION DEFICIT HYPERACTIVITY DISORDER), COMBINED TYPE: ICD-10-CM

## 2023-12-11 PROCEDURE — 99213 OFFICE O/P EST LOW 20 MIN: CPT | Mod: GE

## 2023-12-11 RX ORDER — DEXTROAMPHETAMINE SACCHARATE, AMPHETAMINE ASPARTATE MONOHYDRATE, DEXTROAMPHETAMINE SULFATE AND AMPHETAMINE SULFATE 3.75; 3.75; 3.75; 3.75 MG/1; MG/1; MG/1; MG/1
15 CAPSULE, EXTENDED RELEASE ORAL EVERY MORNING
Qty: 30 CAPSULE | Refills: 0 | Status: SHIPPED | OUTPATIENT
Start: 2024-01-03 | End: 2024-02-02

## 2023-12-11 RX ORDER — DEXTROAMPHETAMINE SACCHARATE, AMPHETAMINE ASPARTATE MONOHYDRATE, DEXTROAMPHETAMINE SULFATE AND AMPHETAMINE SULFATE 3.75; 3.75; 3.75; 3.75 MG/1; MG/1; MG/1; MG/1
15 CAPSULE, EXTENDED RELEASE ORAL EVERY MORNING
Qty: 30 CAPSULE | Refills: 0 | Status: SHIPPED | OUTPATIENT
Start: 2023-12-11 | End: 2024-01-10

## 2023-12-11 ASSESSMENT — ENCOUNTER SYMPTOMS
DOUBLE VISION: 0
POLYDIPSIA: 0
NAUSEA: 1
PALPITATIONS: 0
MYALGIAS: 1
WEIGHT LOSS: 1
COUGH: 1
BLURRED VISION: 0

## 2023-12-11 NOTE — PROGRESS NOTES
"Evaluation completed by: Joe Arenas M.D.   Date of Service: 12/11/23   Appointment type: in-office appointment.  Attending:  Karley Hui M.D.  Information below was collected from: patient    CHIEF COMPLIANT:  ADHD    HPI:   Dalton Farias is a 37 y.o. old male who presents today for regularly scheduled follow up for assessment of ADHD  He reports that since the last appointment, things have been good. He got a promotion at work to . He reports 15mg of adderall XR is working better than 10mg was for him, and notices he has increased focus and better attention span. He still hasn't read the book \"Thriving with Adult ADHD\" but wants to and has the book. He reports general low intake of acidic foods. He reports he drinks a small Yeti mug (12 oz) of coffee per day now in the morning and sometimes drinks tea in the afternoon, sometimes caffienated and sometimes uncaffinated. He takes the adderall around 6:30 am, and takes it right after working out in the gym which he's typically been doing daily. He reports improvements in his sleep schedule, and is going to bed around 9:30 or 10 and waking up around 4 or 4:30 with occasionally falling alseep 11-midnight on weekends.     PSYCHIATRIC REVIEW OF SYSTEMS:current symptoms as reported by pt.  Depression: not currently reporting symptoms of depression  Scott: patient reports no signs or symptoms indicative of scott  Anxiety/Panic Attacks: none  Psychosis: Patient reports no signs or symptoms indicative of psychosis    CURRENT MEDICATIONS    Current Outpatient Medications:     amphetamine-dextroamphetamine (ADDERALL XR) 15 MG XR capsule, Take 1 Capsule by mouth every morning for 30 days. Indications: For ADHD Up to 1 capsule per morning as needed, Disp: 30 Capsule, Rfl: 0    [START ON 1/3/2024] amphetamine-dextroamphetamine (ADDERALL XR) 15 MG XR capsule, Take 1 Capsule by mouth every morning for 30 days., Disp: 30 Capsule, " Rfl: 0    Albuterol Sulfate 108 (90 Base) MCG/ACT AEROSOL POWDER, BREATH ACTIVATED, Take 2 Puffs by mouth every 6 hours as needed (for shortness of breath/wheeezing)., Disp: 1 Each, Rfl: 1    beclomethasone (BECONASE-AQ) 42 MCG/SPRAY nasal spray, Administer 2 Sprays into affected nostril(S) 2 times a day., Disp: , Rfl:     IBUPROFEN PO, Take  by mouth., Disp: , Rfl:     multivitamin (THERAGRAN) Tab, Take 1 Tablet by mouth every day., Disp: , Rfl:     Misc Natural Products (JOINT SUPPORT PO), Take  by mouth., Disp: , Rfl:     REVIEW OF SYSTEMS   Review of Systems   Constitutional:  Positive for weight loss.   HENT:  Positive for tinnitus.    Eyes:  Negative for blurred vision and double vision.   Respiratory:  Positive for cough.    Cardiovascular:  Negative for chest pain and palpitations.   Gastrointestinal:  Positive for nausea.   Genitourinary:  Negative for dysuria and frequency.   Musculoskeletal:  Positive for myalgias.   Skin:  Negative for itching and rash.   Endo/Heme/Allergies:  Negative for polydipsia.   He has intentionally lost weight, having lost 5 pounds recently and is trying to eat fewer calories and works out.  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  Musculoskeletal: No abnormal movements noted  Appearance: well-developed and well-nourished, cooperative and engaged  Thought Process:  linear  Abnormal or Psychotic Thoughts: No  "overt delusions noted  Speech: regular rate, rhythm, volume, tone, and syntax  Mood: \"good\"  Affect: euthymic  SI/HI:  No evidence of SI or HI  Alertness: alert and conversational  Recent and Remote Memory: no gross impairment in immediate, recent, or remote memory  Attention Span and Concentration:  Insight/Judgement into symptoms: good  Neurological Testing (MSSE Score and/or clock drawing): MMSE not performed during this encounter      SCREENINGS:      12/3/2021     2:00 PM 2/2/2022     2:30 PM 1/4/2023    10:20 AM   Depression Screen (PHQ-2/PHQ-9)   PHQ-2 Total Score 0 0 0          LABS:  Lab Results   Component Value Date/Time    CHOLSTRLTOT 157 06/30/2022 12:55 PM    TRIGLYCERIDE 50 06/30/2022 12:55 PM    HDL 68 06/30/2022 12:55 PM    LDL 79 06/30/2022 12:55 PM     Lab Results   Component Value Date/Time    SODIUM 140 06/30/2022 12:55 PM    POTASSIUM 4.4 06/30/2022 12:55 PM    CHLORIDE 104 06/30/2022 12:55 PM    CO2 25 06/30/2022 12:55 PM    ANION 11.0 06/30/2022 12:55 PM    GLUCOSE 96 06/30/2022 12:55 PM    BUN 11 06/30/2022 12:55 PM    CREATININE 0.95 06/30/2022 12:55 PM    CALCIUM 9.7 06/30/2022 12:55 PM    ASTSGOT 17 06/30/2022 12:55 PM    ALTSGPT 15 06/30/2022 12:55 PM    TBILIRUBIN 0.7 06/30/2022 12:55 PM    ALBUMIN 4.6 06/30/2022 12:55 PM    TOTPROTEIN 7.5 06/30/2022 12:55 PM    GLOBULIN 2.9 06/30/2022 12:55 PM    AGRATIO 1.6 06/30/2022 12:55 PM     Lab Results   Component Value Date/Time    WBC 10.3 06/30/2022 12:55 PM    RBC 5.18 06/30/2022 12:55 PM    HEMOGLOBIN 15.5 06/30/2022 12:55 PM    HEMATOCRIT 46.3 06/30/2022 12:55 PM    MCV 89.4 06/30/2022 12:55 PM    MCH 29.9 06/30/2022 12:55 PM    MCHC 33.5 (L) 06/30/2022 12:55 PM    RDW 41.4 06/30/2022 12:55 PM    PLATELETCT 331 06/30/2022 12:55 PM    MPV 10.5 06/30/2022 12:55 PM    NEUTSPOLYS 59.30 06/30/2022 12:55 PM    LYMPHOCYTES 26.80 06/30/2022 12:55 PM    MONOCYTES 6.80 06/30/2022 12:55 PM    EOSINOPHILS 5.30 06/30/2022 12:55 PM    BASOPHILS 1.50 " "06/30/2022 12:55 PM    IMMGRAN 0.30 06/30/2022 12:55 PM    NRBC 0.00 06/30/2022 12:55 PM    NEUTS 6.13 06/30/2022 12:55 PM    MONOS 0.70 06/30/2022 12:55 PM    EOS 0.55 (H) 06/30/2022 12:55 PM    BASO 0.15 (H) 06/30/2022 12:55 PM    IMMGRANAB 0.03 06/30/2022 12:55 PM    NRBCAB 0.00 06/30/2022 12:55 PM     No results found for: \"HBA1C\", \"AVGLUC\"  Lab Results   Component Value Date/Time    TSHULTRASEN 1.460 06/30/2022 1255     No results found for: \"FREET4\"  Lab Results   Component Value Date/Time    25HYDROXY 37 06/30/2022 1255       ASSESSMENT  Dalton Farias is a 37 y.o. old male who presents today for regularly scheduled follow up for assessment of ADHD  Since his last appointment he has been taking adderall XR 15mg qAM as prescribed and reports it is working well for him. He has had the positive event of a recent promotion at his job to , and reports a desire to continue the current dose of adderall. Although he has not yet read the \"Thriving with Adult ADHD\" book, he has it and it may be helpful in the future. May consider if current dose of adderall still optimal at next follow up appointment.    [place]  records   reviewed.  No concerns about misuse of controlled substance.    CURRENT RISK ASSESSMENT       Suicide: Low       Homicide: Low       Self-Harm: Low       Relapse: Not applicable       Crisis Safety Plan Reviewed Not Indicated    DIAGNOSES/PLAN  Problem List Items Addressed This Visit    None  Visit Diagnoses       ADHD (attention deficit hyperactivity disorder), combined type        Relevant Medications    amphetamine-dextroamphetamine (ADDERALL XR) 15 MG XR capsule    amphetamine-dextroamphetamine (ADDERALL XR) 15 MG XR capsule (Start on 1/3/2024)               Medication options, alternatives (including no medications) and medication risks/benefits/side effects were discussed in detail.  The patient was advised to call, message clinician on [employer], or come in to " the clinic if symptoms worsen or if questions/issues regarding their medications arise.  The patient verbalized understanding and agreement.    The patient was educated to call 911, call the suicide hotline, or go to the local ER if having thoughts of suicide or homicide.  The patient verbalized understanding and agreement.   The proposed treatment plan was discussed with the patient who was provided the opportunity to ask questions and make suggestions regarding alternative treatment. Patient verbalized understanding and expressed agreement with the plan.      Follow up in 2 months      This appointment was supervised by attending psychiatrist, Karley Hui M.D., who agrees with assessment and treatment plan.  See attending attestation for more details.

## 2024-02-12 ENCOUNTER — OFFICE VISIT (OUTPATIENT)
Dept: BEHAVIORAL HEALTH | Facility: PSYCHIATRIC FACILITY | Age: 39
End: 2024-02-12
Payer: COMMERCIAL

## 2024-02-12 VITALS
BODY MASS INDEX: 32.1 KG/M2 | SYSTOLIC BLOOD PRESSURE: 140 MMHG | WEIGHT: 242.2 LBS | DIASTOLIC BLOOD PRESSURE: 67 MMHG | HEART RATE: 83 BPM | HEIGHT: 73 IN

## 2024-02-12 DIAGNOSIS — F90.2 ADHD (ATTENTION DEFICIT HYPERACTIVITY DISORDER), COMBINED TYPE: ICD-10-CM

## 2024-02-12 PROCEDURE — 3077F SYST BP >= 140 MM HG: CPT

## 2024-02-12 PROCEDURE — 99213 OFFICE O/P EST LOW 20 MIN: CPT | Mod: GC

## 2024-02-12 PROCEDURE — 3078F DIAST BP <80 MM HG: CPT

## 2024-02-12 RX ORDER — DEXTROAMPHETAMINE SACCHARATE, AMPHETAMINE ASPARTATE MONOHYDRATE, DEXTROAMPHETAMINE SULFATE AND AMPHETAMINE SULFATE 3.75; 3.75; 3.75; 3.75 MG/1; MG/1; MG/1; MG/1
15 CAPSULE, EXTENDED RELEASE ORAL
Qty: 30 CAPSULE | Refills: 0 | Status: SHIPPED | OUTPATIENT
Start: 2024-03-10 | End: 2024-04-09

## 2024-02-12 RX ORDER — DEXTROAMPHETAMINE SACCHARATE, AMPHETAMINE ASPARTATE MONOHYDRATE, DEXTROAMPHETAMINE SULFATE AND AMPHETAMINE SULFATE 3.75; 3.75; 3.75; 3.75 MG/1; MG/1; MG/1; MG/1
15 CAPSULE, EXTENDED RELEASE ORAL
Qty: 30 CAPSULE | Refills: 0 | Status: SHIPPED | OUTPATIENT
Start: 2024-02-12 | End: 2024-03-13

## 2024-02-12 ASSESSMENT — ENCOUNTER SYMPTOMS
CHILLS: 0
FEVER: 0
HEADACHES: 1
HEARTBURN: 1
MYALGIAS: 0
BLURRED VISION: 0
DEPRESSION: 0
DIZZINESS: 0
WHEEZING: 0
DOUBLE VISION: 0
BRUISES/BLEEDS EASILY: 0

## 2024-02-12 ASSESSMENT — FIBROSIS 4 INDEX: FIB4 SCORE: 0.5

## 2024-02-12 ASSESSMENT — LIFESTYLE VARIABLES: SUBSTANCE_ABUSE: 0

## 2024-02-12 NOTE — PROGRESS NOTES
[facility] Outpatient Psychiatric Follow Up Note  Evaluation completed by: Joe Arenas M.D.   Date of Service: 02/12/24   Appointment type: in-office appointment.  Attending:  Marco Antonio Hall M.D.  Information below was collected from: patient    CHIEF COMPLIANT:  ADHD    HPI:   Dalton Farias is a 38 y.o. old male who presents today for regularly scheduled follow up for assessment of ADHD  Mr. Farias describes the holidays as good for him. He reports he's been doing ok, but that work has been stressful. He reports he is working a lot of hours. He describes recent worry about a trip he has coming up in May to go to [place]; Mr. Farias is concerned about the cost and affordability of the vacation. He describes that he becomes self-deprecating and negative, and gets upset. He feels his heart rate goes up during those times, that he breaths heavier, that he's focused on the negatives of the trip, and feels he's not thinking rationally about the trip. He uses the term anxiety to describe these times, saying it's happened a couple of times a year and lasting the whole day; however describes them more as times of being upset. Further details are documented under psychotherapy note for this session.    He feels better focus on adderall and that he can pay more attention when people are talking. He feels it's doing its job and doesn't need to be changed.     Medication Management  Side effects: Reports no side effects from adderall aside from occasionally elevated heartrate.  Compliance: Takes adderall XR every day, takes some days off on the weekends    PSYCHIATRIC REVIEW OF SYSTEMS:current symptoms as reported by pt.  Depression: Denies depressed mood or anhedonia. Reports at times a little sad, especially when working a lot of hours and feeling drained.  Nahomi:  no times of sleeplessness or reduced sleep for 4 or more days since last appointment  Anxiety/Panic Attacks: see HPI.   Psychosis: Patient reports  no signs or symptoms indicative of psychosis    CURRENT MEDICATIONS    Current Outpatient Medications:     amphetamine-dextroamphetamine (ADDERALL XR) 15 MG XR capsule, Take 1 Capsule by mouth every morning as needed (Up to 1 per morning as needed for concentration and focus) for up to 30 days., Disp: 30 Capsule, Rfl: 0    [START ON 3/10/2024] amphetamine-dextroamphetamine (ADDERALL XR) 15 MG XR capsule, Take 1 Capsule by mouth every morning as needed (Up to one per morning needed for concentration and focus) for up to 30 days., Disp: 30 Capsule, Rfl: 0    Albuterol Sulfate 108 (90 Base) MCG/ACT AEROSOL POWDER, BREATH ACTIVATED, Take 2 Puffs by mouth every 6 hours as needed (for shortness of breath/wheeezing)., Disp: 1 Each, Rfl: 1    beclomethasone (BECONASE-AQ) 42 MCG/SPRAY nasal spray, Administer 2 Sprays into affected nostril(S) 2 times a day., Disp: , Rfl:     IBUPROFEN PO, Take  by mouth., Disp: , Rfl:     multivitamin (THERAGRAN) Tab, Take 1 Tablet by mouth every day., Disp: , Rfl:     Misc Natural Products (JOINT SUPPORT PO), Take  by mouth., Disp: , Rfl:     REVIEW OF SYSTEMS   Review of Systems   Constitutional:  Negative for chills and fever.   HENT:  Negative for nosebleeds.    Eyes:  Negative for blurred vision and double vision.   Respiratory:  Negative for wheezing.    Cardiovascular:  Negative for chest pain.   Gastrointestinal:  Positive for heartburn.   Genitourinary:  Negative for frequency.   Musculoskeletal:  Negative for myalgias.   Skin:  Negative for itching and rash.   Neurological:  Positive for headaches. Negative for dizziness.   Endo/Heme/Allergies:  Does not bruise/bleed easily.   Psychiatric/Behavioral:  Negative for depression, substance abuse and suicidal ideas.    Intermittent heartburn when eating Swiss for years off and on. Headaches have happened for years if dehydrated or stressed from work.    Neurologic: no tics, tremors, dyskinesias. The patient denies dizzniess, syncope,  "falls. Ambulates independently    PAST MEDICAL HISTORY  Past Medical History:   Diagnosis Date    Allergy     seasonal     Allergies   Allergen Reactions    Sulfa Drugs Unspecified     Past Surgical History:   Procedure Laterality Date    NASAL POLYPECTOMY  2019    OTHER ORTHOPEDIC SURGERY      left hand surgery-1st, nerve repair, 2nd trigger finger    OTHER ORTHOPEDIC SURGERY      right knee      PSYCHIATRIC EXAMINATION   BP (!) 140/67 (BP Location: Right arm, Patient Position: Sitting, BP Cuff Size: Adult)   Pulse 83   Ht 1.854 m (6' 1\")   Wt 110 kg (242 lb 3.2 oz)   BMI 31.95 kg/m²   Musculoskeletal: No abnormal movements noted  Appearance: well-developed, well-nourished, appears stated age, and no apparent distress, cooperative and engaged  Thought Process:  linear, coherent, and organized  Abnormal or Psychotic Thoughts: denies SI   Speech: regular rate, rhythm, volume, tone, and syntax  Mood: \"ok\"  Affect: euthymic  SI/HI: Denies SI. No evidence of HI.  Alertness: alert and aware  Recent and Remote Memory: no gross impairment in immediate, recent, or remote memory  Attention Span and Concentration: grossly intact  Insight/Judgement into symptoms: good  Neurological Testing (MSSE Score and/or clock drawing): MMSE not performed during this encounter      SCREENINGS:      12/3/2021     2:00 PM 2/2/2022     2:30 PM 1/4/2023    10:20 AM   Depression Screen (PHQ-2/PHQ-9)   PHQ-2 Total Score 0 0 0          LABS:  Lab Results   Component Value Date/Time    CHOLSTRLTOT 157 06/30/2022 12:55 PM    TRIGLYCERIDE 50 06/30/2022 12:55 PM    HDL 68 06/30/2022 12:55 PM    LDL 79 06/30/2022 12:55 PM     Lab Results   Component Value Date/Time    SODIUM 140 06/30/2022 12:55 PM    POTASSIUM 4.4 06/30/2022 12:55 PM    CHLORIDE 104 06/30/2022 12:55 PM    CO2 25 06/30/2022 12:55 PM    ANION 11.0 06/30/2022 12:55 PM    GLUCOSE 96 06/30/2022 12:55 PM    BUN 11 06/30/2022 12:55 PM    CREATININE 0.95 06/30/2022 12:55 PM    CALCIUM " "9.7 06/30/2022 12:55 PM    ASTSGOT 17 06/30/2022 12:55 PM    ALTSGPT 15 06/30/2022 12:55 PM    TBILIRUBIN 0.7 06/30/2022 12:55 PM    ALBUMIN 4.6 06/30/2022 12:55 PM    TOTPROTEIN 7.5 06/30/2022 12:55 PM    GLOBULIN 2.9 06/30/2022 12:55 PM    AGRATIO 1.6 06/30/2022 12:55 PM     Lab Results   Component Value Date/Time    WBC 10.3 06/30/2022 12:55 PM    RBC 5.18 06/30/2022 12:55 PM    HEMOGLOBIN 15.5 06/30/2022 12:55 PM    HEMATOCRIT 46.3 06/30/2022 12:55 PM    MCV 89.4 06/30/2022 12:55 PM    MCH 29.9 06/30/2022 12:55 PM    MCHC 33.5 (L) 06/30/2022 12:55 PM    RDW 41.4 06/30/2022 12:55 PM    PLATELETCT 331 06/30/2022 12:55 PM    MPV 10.5 06/30/2022 12:55 PM    NEUTSPOLYS 59.30 06/30/2022 12:55 PM    LYMPHOCYTES 26.80 06/30/2022 12:55 PM    MONOCYTES 6.80 06/30/2022 12:55 PM    EOSINOPHILS 5.30 06/30/2022 12:55 PM    BASOPHILS 1.50 06/30/2022 12:55 PM    IMMGRAN 0.30 06/30/2022 12:55 PM    NRBC 0.00 06/30/2022 12:55 PM    NEUTS 6.13 06/30/2022 12:55 PM    MONOS 0.70 06/30/2022 12:55 PM    EOS 0.55 (H) 06/30/2022 12:55 PM    BASO 0.15 (H) 06/30/2022 12:55 PM    IMMGRANAB 0.03 06/30/2022 12:55 PM    NRBCAB 0.00 06/30/2022 12:55 PM     No results found for: \"HBA1C\", \"AVGLUC\"  Lab Results   Component Value Date/Time    TSHULTRASEN 1.460 06/30/2022 1255     No results found for: \"FREET4\"  Lab Results   Component Value Date/Time    25HYDROXY 37 06/30/2022 1255       ASSESSMENT  Dalton Farias is a 38 y.o. old male who presents today for regularly scheduled follow up for assessment of ADHD  He appears to be doing well on his current medication with reported efficacy and no reported side effects or issues. He describes no depression or experiences meeting the terminology of clinical anxiety, but described intermittent long-term relationship communication challenges with his wife that are documented in a psychotherapy note for this session. Discussion of strategies for these is also documented in the psychotherapy note, and " he brought up that he would like to bring her to the next appointment so they can be discussed with her present.    [place]  records   reviewed.  No concerns about misuse of controlled substance.    CURRENT RISK ASSESSMENT       Suicide: Low       Homicide: Low       Self-Harm: Low       Relapse: Low       Crisis Safety Plan Reviewed Not Indicated    DIAGNOSES/PLAN  Problem List Items Addressed This Visit    None  Visit Diagnoses       ADHD (attention deficit hyperactivity disorder), combined type        Relevant Medications    amphetamine-dextroamphetamine (ADDERALL XR) 15 MG XR capsule    amphetamine-dextroamphetamine (ADDERALL XR) 15 MG XR capsule (Start on 3/10/2024)           Medication options, alternatives (including no medications) and medication risks/benefits/side effects were discussed in detail.  The patient was advised to call, message clinician on Stevie, or come in to the clinic if symptoms worsen or if questions/issues regarding their medications arise.  The patient verbalized understanding and agreement.    The patient was educated to call 911, call the suicide hotline, or go to the local ER if having thoughts of suicide or homicide.  The patient verbalized understanding and agreement.   The proposed treatment plan was discussed with the patient who was provided the opportunity to ask questions and make suggestions regarding alternative treatment. Patient verbalized understanding and expressed agreement with the plan.      Follow up in 2 months    This appointment was supervised by attending psychiatrist, Marco Antonio Hall M.D., who agrees with assessment and treatment plan.  See attending attestation for more details.

## 2024-02-12 NOTE — PSYCHOTHERAPY
He describes that his wife may think he's being nitpicky, and that she gets upset and defensive during these times when they argue. He describes this as an issue off and on for years, and he thinks it has improved from previous years. He wanted to discuss this due to its impact on his relationship with his wife. He describes that he hasn't talked with his wife about these times of being upset when they're not happening aside from talking soon after the fact, and that he has trouble communicating about the actual issue in the most recent case finances. He describes he would like for his wife to join the next visit.     He describes wanting to feel comforted when he's feeling this way and to be held, and that his wife doesn't innately tend to do this or remember to do it. He has gone through counseling with his wife, and remembers a lot of the things from the previous counseling sessions. He feels he can often talk through things after, but it doesn't help in the moment although he hasn't made plans for all the situations that happen.    Strategies we discussed include focusing on communicating the emotion he's feeling rather than focusing on logistics and circumstances as well as planning ahead for these and discussing with his wife what a healthy way to handle these sorts of situations is before they happen given that it's been a recurrent pattern. He described that these seemed like helpful approaches.

## 2024-02-27 ENCOUNTER — TELEMEDICINE (OUTPATIENT)
Dept: MEDICAL GROUP | Facility: IMAGING CENTER | Age: 39
End: 2024-02-27
Payer: COMMERCIAL

## 2024-02-27 VITALS — BODY MASS INDEX: 30.16 KG/M2 | WEIGHT: 235 LBS | HEIGHT: 74 IN

## 2024-02-27 DIAGNOSIS — G57.02 PIRIFORMIS SYNDROME OF LEFT SIDE: ICD-10-CM

## 2024-02-27 DIAGNOSIS — M54.32 SCIATICA OF LEFT SIDE: ICD-10-CM

## 2024-02-27 PROCEDURE — 99213 OFFICE O/P EST LOW 20 MIN: CPT | Mod: 95 | Performed by: PHYSICIAN ASSISTANT

## 2024-02-27 RX ORDER — METHYLPREDNISOLONE 4 MG/1
TABLET ORAL
Qty: 21 TABLET | Refills: 0 | Status: SHIPPED | OUTPATIENT
Start: 2024-02-27

## 2024-02-27 RX ORDER — MELOXICAM 15 MG/1
15 TABLET ORAL DAILY
Qty: 14 TABLET | Refills: 0 | Status: SHIPPED | OUTPATIENT
Start: 2024-02-27

## 2024-02-27 RX ORDER — BUDESONIDE 0.5 MG/2ML
INHALANT ORAL
COMMUNITY
Start: 2024-01-14

## 2024-02-27 ASSESSMENT — PAIN SCALES - GENERAL: PAINLEVEL: 3=SLIGHT PAIN

## 2024-02-27 ASSESSMENT — PATIENT HEALTH QUESTIONNAIRE - PHQ9: CLINICAL INTERPRETATION OF PHQ2 SCORE: 0

## 2024-02-27 ASSESSMENT — FIBROSIS 4 INDEX: FIB4 SCORE: 0.5

## 2024-02-28 NOTE — ASSESSMENT & PLAN NOTE
Patient admits to left lower back pain that started a week and a half ago.  Symptoms started after a long bus ride while traveling.  He had also been on multiple flights that day.  He states once he got up from the bus seat he developed pain in his left lower back and into his buttocks.  The pain is sharp and stabbing.  Pain is worse from sitting to standing.  No pain with prolonged sitting or prolonged standing.  He states a couple weeks prior he had been doing some dead lifting and had some pain at that time.  Denies any spasms.  No over-the-counter medications.  No popping sensation.  No weakness, numbness, tingling, changes in gait.

## 2024-02-28 NOTE — PROGRESS NOTES
Virtual Visit: Established Patient   This visit was conducted via Zoom using secure and encrypted videoconferencing technology. The patient was in a private location in the state of [place].    The patient's identity was confirmed and verbal consent was obtained for this virtual visit.    Subjective:   CC:   Chief Complaint   Patient presents with    Back Pain     Lower back pain, X1week        Dalton Farias is a 38 y.o. male presenting for evaluation and management of:    Sciatica of left side  Patient admits to left lower back pain that started a week and a half ago.  Symptoms started after a long bus ride while traveling.  He had also been on multiple flights that day.  He states once he got up from the bus seat he developed pain in his left lower back and into his buttocks.  The pain is sharp and stabbing.  Pain is worse from sitting to standing.  No pain with prolonged sitting or prolonged standing.  He states a couple weeks prior he had been doing some dead lifting and had some pain at that time.  Denies any spasms.  No over-the-counter medications.  No popping sensation.  No weakness, numbness, tingling, changes in gait.    Depression Screening    Little interest or pleasure in doing things?  0 - not at all  Feeling down, depressed , or hopeless? 0 - not at all  Patient Health Questionnaire Score: 0    ROS   Denies any recent fevers or chills. No nausea or vomiting. No chest pains or shortness of breath.     Allergies   Allergen Reactions    Sulfa Drugs Unspecified    Sulfamethoxazole      Other Reaction(s): Sulfacetamide adverse reaction       Current medicines (including changes today)  Current Outpatient Medications   Medication Sig Dispense Refill    budesonide (PULMICORT) 0.5 MG/2ML Suspension USE ONE VIAL DAILY AS DIRECTED FOR ASTHMA      methylPREDNISolone (MEDROL DOSEPAK) 4 MG Tablet Therapy Pack As directed on the packaging label. 21 Tablet 0    meloxicam (MOBIC) 15 MG tablet Take 1 Tablet by  "mouth every day. 14 Tablet 0    amphetamine-dextroamphetamine (ADDERALL XR) 15 MG XR capsule Take 1 Capsule by mouth every morning as needed (Up to 1 per morning as needed for concentration and focus) for up to 30 days. 30 Capsule 0    [START ON 3/10/2024] amphetamine-dextroamphetamine (ADDERALL XR) 15 MG XR capsule Take 1 Capsule by mouth every morning as needed (Up to one per morning needed for concentration and focus) for up to 30 days. 30 Capsule 0    Albuterol Sulfate 108 (90 Base) MCG/ACT AEROSOL POWDER, BREATH ACTIVATED Take 2 Puffs by mouth every 6 hours as needed (for shortness of breath/wheeezing). 1 Each 1    beclomethasone (BECONASE-AQ) 42 MCG/SPRAY nasal spray Administer 2 Sprays into affected nostril(S) 2 times a day.      IBUPROFEN PO Take  by mouth.      multivitamin (THERAGRAN) Tab Take 1 Tablet by mouth every day.      Misc Natural Products (JOINT SUPPORT PO) Take  by mouth.       No current facility-administered medications for this visit.       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  He  has a past surgical history that includes other orthopedic surgery; other orthopedic surgery; and nasal polypectomy (2019).         Objective:   Ht 1.88 m (6' 2\")   Wt 107 kg (235 lb)   BMI 30.17 " kg/m²   RR 12    Physical Exam:  Constitutional: Alert, no distress, well-groomed.  Skin: No rashes in visible areas.  Eye: Round. Conjunctiva clear, lids normal. No icterus.   ENMT: Lips pink without lesions, good dentition, moist mucous membranes. Phonation normal.  Neck: No masses, no thyromegaly. Moves freely without pain.  Respiratory: Unlabored respiratory effort, no cough or audible wheeze  Psych: Alert and oriented x3, normal affect and mood.     Assessment and Plan:   The following treatment plan was discussed:     1. Sciatica of left side  High suspicion for sciatica with piriformis syndrome.  Discussed avoiding prolonged sitting/flexed position of the hips.  Will try meloxicam and Medrol Dosepak and refer to physical therapy.  Instructed patient to schedule an in office visit for physical examination especially if symptoms do not improve, as back pain can be difficult to diagnose via telehealth.  May trial ice alternating with heat for comfort.  - Referral to Physical Therapy  - methylPREDNISolone (MEDROL DOSEPAK) 4 MG Tablet Therapy Pack; As directed on the packaging label.  Dispense: 21 Tablet; Refill: 0  - meloxicam (MOBIC) 15 MG tablet; Take 1 Tablet by mouth every day.  Dispense: 14 Tablet; Refill: 0   Potential side effects of steroids include jitteriness, increased hunger, anxiety, restlessness, difficulty sleeping, indigestion/acid reflux.  If you develop new symptoms, please follow-up in office to discuss.  If you develop severe symptoms and are unable to schedule an appointment, please go to urgent care or emergency department.    2. Piriformis syndrome of left side  - Referral to Physical Therapy  - methylPREDNISolone (MEDROL DOSEPAK) 4 MG Tablet Therapy Pack; As directed on the packaging label.  Dispense: 21 Tablet; Refill: 0  - meloxicam (MOBIC) 15 MG tablet; Take 1 Tablet by mouth every day.  Dispense: 14 Tablet; Refill: 0      Follow-up: Return if symptoms worsen or fail to improve.          Sonia Soriano PA-C ([place])  Physician Assistant Certified  [facility]    Please note that this dictation was created using voice recognition software. I have made every reasonable attempt to correct obvious errors, but I expect that there are errors of grammar and possibly content that I did not discover before finalizing the note.

## 2024-02-28 NOTE — PATIENT INSTRUCTIONS
It was a pleasure meeting with you today at [facility]!    Your medical history/records and medications were reviewed today.     UPDATE on MyChart Results: If you have blood work, and/or imaging studies, or any other test or procedure completed, you will have access to results as soon as they become available in MyChart. Recently, these results will be available for review at the same time that your provider is able to see results!    This will likely mean you will see a result before your provider has had a chance to review and discuss with you.  Some results or care notes may be hard to understand and may be serious in nature.    We look at every result and your provider will contact you to explain what they mean and discuss appropriate next steps. Please allow for at least 72 business hours for chart and result review.     We prefer that you wait for your care team to contact you with your results.  Often, your provider will discuss your results with you at your next appointment. We look forward to continuing to partner with you in your care.    Please review my practice information below:    If you have any prescription refill requests, please send them via [employer] or discuss with your provider at the start of your office visits. Please allow 3-5 business days for lab and testing review and you will be contacted via [employer] with those results, or if advised to make a follow up appointment regarding those results, then please do so.     Once resulted, your lab/test/imaging results will show up automatically in your MyChart. Please wait for my interpretation and recommendations prior to viewing your results to avoid any unnecessary confusion or misinterpretation. I will address all of the lab values that I interpret as abnormal and message you accordingly on your MyChart. I will always send you a message about your results even if they are normal. If you do not hear back from me within 5-7 business  days after completing your tests, then please send me a message on [employer] so I can obtain your results (especially if you went to an outside lab or imaging center - [facility], [facility], etc).     If you have any additional questions or concerns beyond my interpretation of your results, please make an appointment with me to discuss in further detail.    Please only use the [employer] messaging system for questions regarding your most recent appointment or if advised to use otherwise (glucose or blood pressure reporting).     If you have any new problems or concerns, you must make an appointment to discuss. This includes any referral requests, lab requests (unless advised to notify me for pre-appt labs), medication side effects, or request for medication adjustments.     Please arrive 15 minutes prior to your appointment time to complete your check-in and intake with the medical assistant.      Thank you,    Sonia Soriano PA-C ([place]aker)  Physician Assistant Certified  [facility]    -----------------------------------------------------------------    Attn: Patients of [facility]:    In an effort to continue to provide excellent and efficient care to our patients, it is vital that we continue to use our resources appropriately. With that, this is a reminder that [employer] is used for prescription refill requests, test results, virtual visits, and chart review only.     Any new questions, concerns/conditions, lab/imaging requests, medication adjustments, new prescriptions, or referral requests do require an appointment (virtually or in person), unless discussed otherwise at your most recent appointment.     Thank you for your understanding,    [facility]

## 2024-04-04 DIAGNOSIS — F90.2 ADHD (ATTENTION DEFICIT HYPERACTIVITY DISORDER), COMBINED TYPE: ICD-10-CM

## 2024-04-04 RX ORDER — DEXTROAMPHETAMINE SACCHARATE, AMPHETAMINE ASPARTATE MONOHYDRATE, DEXTROAMPHETAMINE SULFATE AND AMPHETAMINE SULFATE 3.75; 3.75; 3.75; 3.75 MG/1; MG/1; MG/1; MG/1
15 CAPSULE, EXTENDED RELEASE ORAL
Qty: 30 CAPSULE | Refills: 0 | Status: SHIPPED | OUTPATIENT
Start: 2024-04-04 | End: 2024-04-15 | Stop reason: SDUPTHER

## 2024-04-15 ENCOUNTER — OFFICE VISIT (OUTPATIENT)
Dept: BEHAVIORAL HEALTH | Facility: PSYCHIATRIC FACILITY | Age: 39
End: 2024-04-15
Payer: COMMERCIAL

## 2024-04-15 VITALS
OXYGEN SATURATION: 97 % | HEIGHT: 74 IN | HEART RATE: 94 BPM | BODY MASS INDEX: 32.14 KG/M2 | WEIGHT: 250.4 LBS | SYSTOLIC BLOOD PRESSURE: 110 MMHG | DIASTOLIC BLOOD PRESSURE: 70 MMHG

## 2024-04-15 DIAGNOSIS — F90.2 ADHD (ATTENTION DEFICIT HYPERACTIVITY DISORDER), COMBINED TYPE: ICD-10-CM

## 2024-04-15 PROCEDURE — 3074F SYST BP LT 130 MM HG: CPT

## 2024-04-15 PROCEDURE — 99213 OFFICE O/P EST LOW 20 MIN: CPT | Mod: GE

## 2024-04-15 PROCEDURE — 3078F DIAST BP <80 MM HG: CPT

## 2024-04-15 RX ORDER — DEXTROAMPHETAMINE SACCHARATE, AMPHETAMINE ASPARTATE MONOHYDRATE, DEXTROAMPHETAMINE SULFATE AND AMPHETAMINE SULFATE 3.75; 3.75; 3.75; 3.75 MG/1; MG/1; MG/1; MG/1
15 CAPSULE, EXTENDED RELEASE ORAL EVERY MORNING
Qty: 30 CAPSULE | Refills: 0 | Status: SHIPPED | OUTPATIENT
Start: 2024-05-28 | End: 2024-06-27

## 2024-04-15 RX ORDER — DEXTROAMPHETAMINE SACCHARATE, AMPHETAMINE ASPARTATE MONOHYDRATE, DEXTROAMPHETAMINE SULFATE AND AMPHETAMINE SULFATE 3.75; 3.75; 3.75; 3.75 MG/1; MG/1; MG/1; MG/1
15 CAPSULE, EXTENDED RELEASE ORAL
Qty: 30 CAPSULE | Refills: 0 | Status: SHIPPED | OUTPATIENT
Start: 2024-05-01 | End: 2024-05-31

## 2024-04-15 ASSESSMENT — ENCOUNTER SYMPTOMS
FEVER: 0
WHEEZING: 0
EYE PAIN: 0
HEARTBURN: 1
CHILLS: 0
POLYDIPSIA: 0
NAUSEA: 0
TREMORS: 0
EYE DISCHARGE: 0
DEPRESSION: 0
VOMITING: 0
MYALGIAS: 0
DIZZINESS: 0
TINGLING: 0
HEADACHES: 1

## 2024-04-15 ASSESSMENT — FIBROSIS 4 INDEX: FIB4 SCORE: 0.5

## 2024-04-15 ASSESSMENT — LIFESTYLE VARIABLES: SUBSTANCE_ABUSE: 0

## 2024-04-15 NOTE — PROGRESS NOTES
[facility] Outpatient Psychiatric Follow Up Note  Evaluation completed by: Joe Arenas M.D.   Date of Service: 04/15/24   Appointment type: in-office appointment.  Attending:  Marco Antonio Hall M.D.  Information below was collected from: patient  CHIEF COMPLIANT:  ADHD    HPI:   Dalton Farias is a 38 y.o. old male who presents today for regularly scheduled follow up for assessment of ADHD  He reports he is doing well since his last appointment, and has been busy with work and family. He reports his relationship with family and his wife has been good, and they've managed to finish paying off some debt. He described that he is now working 80 hours a week as there is a big project at  his job and they're extremely short staffed, and that he has 3 projects total currently. 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 He described adderall XR is still helping with focus and being engaged, and that he is good with continuing the current dose.    Medication Management  Side effects: He reports no side effects.  Compliance: He takes adderall XR weekedays, sometimes skips it on weekends.     PSYCHIATRIC REVIEW OF SYSTEMS:current symptoms as reported by pt.  Depression: Reports intermittent feelings of being down lasting only a day or two or less, just occasional blues when stressed. He reports no continuous depression.   Nahomi: He is now getting around 4-6 hours of sleep a night because he's working more right now, but no times of elevated energy or mood lasting a few days or longer.   Anxiety/Panic Attacks: Reports he jonna anxious one night right before bed since the last appointment. He feels stressed because of the project he's working on, but reports no physical symptoms most of the time although there has been a time he noticed elevated heartrate during a meeting when he had a lot of coffee before. He reports this is not at a point that's bothering him or  unmanageable.   Psychosis: Patient reports no signs or symptoms indicative of psychosis    CURRENT MEDICATIONS    Current Outpatient Medications:     [START ON 5/1/2024] amphetamine-dextroamphetamine (ADDERALL XR) 15 MG XR capsule, Take 1 Capsule by mouth every morning as needed (Up to one per morning needed for concentration and focus) for up to 30 days., Disp: 30 Capsule, Rfl: 0    [START ON 5/28/2024] amphetamine-dextroamphetamine (ADDERALL XR) 15 MG XR capsule, Take 1 Capsule by mouth every morning for 30 days., Disp: 30 Capsule, Rfl: 0    budesonide (PULMICORT) 0.5 MG/2ML Suspension, USE ONE VIAL DAILY AS DIRECTED FOR ASTHMA, Disp: , Rfl:     methylPREDNISolone (MEDROL DOSEPAK) 4 MG Tablet Therapy Pack, As directed on the packaging label., Disp: 21 Tablet, Rfl: 0    meloxicam (MOBIC) 15 MG tablet, Take 1 Tablet by mouth every day., Disp: 14 Tablet, Rfl: 0    Albuterol Sulfate 108 (90 Base) MCG/ACT AEROSOL POWDER, BREATH ACTIVATED, Take 2 Puffs by mouth every 6 hours as needed (for shortness of breath/wheeezing)., Disp: 1 Each, Rfl: 1    beclomethasone (BECONASE-AQ) 42 MCG/SPRAY nasal spray, Administer 2 Sprays into affected nostril(S) 2 times a day., Disp: , Rfl:     IBUPROFEN PO, Take  by mouth., Disp: , Rfl:     multivitamin (THERAGRAN) Tab, Take 1 Tablet by mouth every day., Disp: , Rfl:     Misc Natural Products (JOINT SUPPORT PO), Take  by mouth., Disp: , Rfl:     REVIEW OF SYSTEMS   Review of Systems   Constitutional:  Negative for chills and fever.   HENT:  Negative for hearing loss.    Eyes:  Negative for pain and discharge.   Respiratory:  Negative for wheezing.    Cardiovascular:  Negative for chest pain.   Gastrointestinal:  Positive for heartburn. Negative for nausea and vomiting.   Genitourinary:  Negative for hematuria.   Musculoskeletal:  Negative for myalgias.   Skin:  Negative for itching and rash.   Neurological:  Positive for headaches. Negative for dizziness, tingling and tremors.  "  Endo/Heme/Allergies:  Negative for polydipsia.   Psychiatric/Behavioral:  Negative for depression, substance abuse and suicidal ideas.    Reports he had rashes and itching after a trip to [place] that went away after a week, and this was a month ago. He agrees if it comes back to see a primary care doctor. Has chronic intermittent heartburn not worse recently. He reports occasional headaches when stressed or not drinking enough water, tends to get them more when working longer hours.     PAST MEDICAL HISTORY  Past Medical History:   Diagnosis Date    Allergy     seasonal     Allergies   Allergen Reactions    Sulfa Drugs Unspecified    Sulfamethoxazole      Other Reaction(s): Sulfacetamide adverse reaction     Past Surgical History:   Procedure Laterality Date    NASAL POLYPECTOMY  2019    OTHER ORTHOPEDIC SURGERY      left hand surgery-1st, nerve repair, 2nd trigger finger    OTHER ORTHOPEDIC SURGERY      right knee        SOCIAL HX  Alcohol: A couple of beers a week on some weeks socially  Illicit Drug use: Reports none    PSYCHIATRIC EXAMINATION   /70 (BP Location: Left arm, Patient Position: Sitting, BP Cuff Size: Adult)   Pulse 94   Ht 1.88 m (6' 2\")   Wt 114 kg (250 lb 6.4 oz)   SpO2 97%   BMI 32.15 kg/m²   Musculoskeletal: No abnormal movements noted  Appearance: well-developed, well-nourished, and appears stated age, cooperative, engaged, and friendly  Thought Process:  linear, coherent, and goal-oriented  Abnormal or Psychotic Thoughts: denies SI   Speech: regular rate, rhythm, volume, tone, and syntax  Mood: \"ok\"  Affect: euthymic  Orientation:  No gross evidence of disorientation  Recent and Remote Memory: no gross impairment in immediate, recent, or remote memory  Insight/Judgement into symptoms: good  Neurological Testing (MSSE Score and/or clock drawing): MMSE not performed during this encounter    SCREENINGS:      2/2/2022     2:30 PM 1/4/2023    10:20 AM 2/27/2024     5:00 PM   Depression " "Screen (PHQ-2/PHQ-9)   PHQ-2 Total Score 0 0 0        LABS:  Lab Results   Component Value Date/Time    CHOLSTRLTOT 157 06/30/2022 12:55 PM    TRIGLYCERIDE 50 06/30/2022 12:55 PM    HDL 68 06/30/2022 12:55 PM    LDL 79 06/30/2022 12:55 PM     Lab Results   Component Value Date/Time    SODIUM 140 06/30/2022 12:55 PM    POTASSIUM 4.4 06/30/2022 12:55 PM    CHLORIDE 104 06/30/2022 12:55 PM    CO2 25 06/30/2022 12:55 PM    ANION 11.0 06/30/2022 12:55 PM    GLUCOSE 96 06/30/2022 12:55 PM    BUN 11 06/30/2022 12:55 PM    CREATININE 0.95 06/30/2022 12:55 PM    CALCIUM 9.7 06/30/2022 12:55 PM    ASTSGOT 17 06/30/2022 12:55 PM    ALTSGPT 15 06/30/2022 12:55 PM    TBILIRUBIN 0.7 06/30/2022 12:55 PM    ALBUMIN 4.6 06/30/2022 12:55 PM    TOTPROTEIN 7.5 06/30/2022 12:55 PM    GLOBULIN 2.9 06/30/2022 12:55 PM    AGRATIO 1.6 06/30/2022 12:55 PM     Lab Results   Component Value Date/Time    WBC 10.3 06/30/2022 12:55 PM    RBC 5.18 06/30/2022 12:55 PM    HEMOGLOBIN 15.5 06/30/2022 12:55 PM    HEMATOCRIT 46.3 06/30/2022 12:55 PM    MCV 89.4 06/30/2022 12:55 PM    MCH 29.9 06/30/2022 12:55 PM    MCHC 33.5 (L) 06/30/2022 12:55 PM    RDW 41.4 06/30/2022 12:55 PM    PLATELETCT 331 06/30/2022 12:55 PM    MPV 10.5 06/30/2022 12:55 PM    NEUTSPOLYS 59.30 06/30/2022 12:55 PM    LYMPHOCYTES 26.80 06/30/2022 12:55 PM    MONOCYTES 6.80 06/30/2022 12:55 PM    EOSINOPHILS 5.30 06/30/2022 12:55 PM    BASOPHILS 1.50 06/30/2022 12:55 PM    IMMGRAN 0.30 06/30/2022 12:55 PM    NRBC 0.00 06/30/2022 12:55 PM    NEUTS 6.13 06/30/2022 12:55 PM    MONOS 0.70 06/30/2022 12:55 PM    EOS 0.55 (H) 06/30/2022 12:55 PM    BASO 0.15 (H) 06/30/2022 12:55 PM    IMMGRANAB 0.03 06/30/2022 12:55 PM    NRBCAB 0.00 06/30/2022 12:55 PM     Lab Results   Component Value Date/Time    TSHULTRASEN 1.460 06/30/2022 1255     No results found for: \"FREET4\"  Lab Results   Component Value Date/Time    25HYDROXY 37 06/30/2022 1255     ASSESSMENT  Dalton Farias is a 38 y.o. " old male who presents today for regularly scheduled follow up for assessment of ADHD  He has had success managing situations where there are some common types of intermittent interpersonal stress, and has had times of being aware of feelings and dynamics in the relationship and when to take some time to re-approach a conversation later. Further details documented under psychotherapy note for this appointment. He has been very busy with his job recently but has been overall stable since his last appointment, and it appears that the current prescription of Adderall XR is helping with focus and engagement without reported side effects. Continuation is merited given efficacy and tolerability.     [place]  records   reviewed.  No concerns about misuse of controlled substance.    CURRENT RISK ASSESSMENT       Suicide: Low       Homicide: Low       Self-Harm: Low       Relapse: Not applicable       Crisis Safety Plan Reviewed Not Indicated    DIAGNOSES/PLAN  Problem List Items Addressed This Visit    None  Visit Diagnoses       ADHD (attention deficit hyperactivity disorder), combined type        Relevant Medications    amphetamine-dextroamphetamine (ADDERALL XR) 15 MG XR capsule (Start on 5/1/2024)    amphetamine-dextroamphetamine (ADDERALL XR) 15 MG XR capsule (Start on 5/28/2024)          Medication options, alternatives (including no medications) and medication risks/benefits/side effects were discussed in detail.  The patient was advised to call, message clinician on [employer], or come in to the clinic if symptoms worsen or if questions/issues regarding their medications arise.  The patient verbalized understanding and agreement.    The patient was educated to call 911, call the suicide hotline, or go to the local ER if having thoughts of suicide or homicide.  The patient verbalized understanding and agreement.   The proposed treatment plan was discussed with the patient who was provided the opportunity to ask  questions and make suggestions regarding alternative treatment. Patient verbalized understanding and expressed agreement with the plan.      Follow up in 2 months.    This appointment was supervised by attending psychiatrist, Marco Antonio Hall M.D., who agrees with assessment and treatment plan.  See attending attestation for more details.

## 2024-06-10 ENCOUNTER — OFFICE VISIT (OUTPATIENT)
Dept: BEHAVIORAL HEALTH | Facility: PSYCHIATRIC FACILITY | Age: 39
End: 2024-06-10
Payer: COMMERCIAL

## 2024-06-10 VITALS
WEIGHT: 252 LBS | DIASTOLIC BLOOD PRESSURE: 70 MMHG | OXYGEN SATURATION: 99 % | HEART RATE: 96 BPM | SYSTOLIC BLOOD PRESSURE: 118 MMHG | BODY MASS INDEX: 32.34 KG/M2 | HEIGHT: 74 IN

## 2024-06-10 DIAGNOSIS — F90.2 ADHD (ATTENTION DEFICIT HYPERACTIVITY DISORDER), COMBINED TYPE: ICD-10-CM

## 2024-06-10 PROCEDURE — 3074F SYST BP LT 130 MM HG: CPT

## 2024-06-10 PROCEDURE — 3078F DIAST BP <80 MM HG: CPT

## 2024-06-10 PROCEDURE — 99213 OFFICE O/P EST LOW 20 MIN: CPT

## 2024-06-10 RX ORDER — DEXTROAMPHETAMINE SACCHARATE, AMPHETAMINE ASPARTATE MONOHYDRATE, DEXTROAMPHETAMINE SULFATE AND AMPHETAMINE SULFATE 3.75; 3.75; 3.75; 3.75 MG/1; MG/1; MG/1; MG/1
15 CAPSULE, EXTENDED RELEASE ORAL EVERY MORNING
Qty: 30 CAPSULE | Refills: 0 | Status: SHIPPED | OUTPATIENT
Start: 2024-06-10 | End: 2024-07-10

## 2024-06-10 RX ORDER — DEXTROAMPHETAMINE SACCHARATE, AMPHETAMINE ASPARTATE MONOHYDRATE, DEXTROAMPHETAMINE SULFATE AND AMPHETAMINE SULFATE 3.75; 3.75; 3.75; 3.75 MG/1; MG/1; MG/1; MG/1
15 CAPSULE, EXTENDED RELEASE ORAL EVERY MORNING
Qty: 30 CAPSULE | Refills: 0 | Status: SHIPPED | OUTPATIENT
Start: 2024-07-09 | End: 2024-08-08

## 2024-06-10 ASSESSMENT — ENCOUNTER SYMPTOMS
FEVER: 0
SHORTNESS OF BREATH: 0
SORE THROAT: 0
CHILLS: 0
DOUBLE VISION: 0
HEARTBURN: 0
BLURRED VISION: 0
DIZZINESS: 0
HEADACHES: 1
MYALGIAS: 0
COUGH: 0

## 2024-06-10 ASSESSMENT — LIFESTYLE VARIABLES: SUBSTANCE_ABUSE: 0

## 2024-06-10 ASSESSMENT — FIBROSIS 4 INDEX: FIB4 SCORE: 0.5

## 2024-06-10 NOTE — PROGRESS NOTES
[facility] Outpatient Psychiatric Follow Up Note  Evaluation completed by: Joe Arenas M.D.   Date of Service: 06/10/24   Appointment type: in-office appointment.  Attending:  Arin Pritchard D.O.  Information below was collected from: patient    CHIEF COMPLIANT:  ADHD    HPI:   Dalton Farias is a 38 y.o. 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 We discussed some areas of his personal life, and the details are documented in the psychotherapy note for this appointment. He described that he is not having difficulty concentrating on current dose of adderall, feels good on this amount, no side effects to adderall. He reports agreement with continuation of current dosage of adderall.    PSYCHIATRIC REVIEW OF SYSTEMS:current symptoms as reported by pt.  Depression: Reports no longitudinal depression, has times within a day where he feels down but that's not the whole day and is transient.   Nahomi: Patient denies any change in mood, increased energy, or marked irritability  Anxiety/Panic Attacks: Reports worry about different topics such as work and family, occasionally subjective feeling of heart racing infrequently despite regular pulse, no chest pain or other symptoms, feels worru not causing a problem in his life and worry is about routine topics.   Psychosis: Patient reports no signs or symptoms indicative of psychosis  ADHD: Described above in HPI.    CURRENT MEDICATIONS    Current Outpatient Medications:     amphetamine-dextroamphetamine, 15 mg, Oral, QAM    [START ON 7/9/2024] amphetamine-dextroamphetamine, 15 mg, Oral, QAM    budesonide, USE ONE VIAL DAILY AS DIRECTED FOR ASTHMA    methylPREDNISolone, As directed on the packaging  label.    meloxicam, 15 mg, Oral, DAILY    Albuterol Sulfate, 2 Puff, Oral, Q6HRS PRN    beclomethasone, 2 Spray, Nasal, BID    IBUPROFEN PO, Take  by mouth.    multivitamin, 1 Tablet, Oral, DAILY    Misc Natural Products (JOINT SUPPORT PO), Take  by mouth.    REVIEW OF SYSTEMS   Review of Systems   Constitutional:  Negative for chills and fever.   HENT:  Negative for sore throat.    Eyes:  Negative for blurred vision and double vision.   Respiratory:  Negative for cough and shortness of breath.    Cardiovascular:  Negative for chest pain.   Gastrointestinal:  Negative for heartburn.   Genitourinary:  Negative for hematuria.   Musculoskeletal:  Negative for myalgias.   Skin:  Negative for itching and rash.   Neurological:  Positive for headaches. Negative for dizziness.   Endo/Heme/Allergies:  Negative for environmental allergies.   Psychiatric/Behavioral:  Negative for substance abuse and suicidal ideas.    Headaches every now and then when staring at a computer too long; getting new prescription glasses and he feels it might be related to his glasses. Headaches not a consistent issue. Not worse with adderall. Ibuprofen works weel for headaches.     PAST MEDICAL HISTORY  Past Medical History:   Diagnosis Date    Allergy     seasonal     Allergies   Allergen Reactions    Sulfa Drugs Unspecified    Sulfamethoxazole      Other Reaction(s): Sulfacetamide adverse reaction     Past Surgical History:   Procedure Laterality Date    NASAL POLYPECTOMY  2019    OTHER ORTHOPEDIC SURGERY      left hand surgery-1st, nerve repair, 2nd trigger finger    OTHER ORTHOPEDIC SURGERY      right knee      Family History   Problem Relation Age of Onset    Obesity Mother     Obesity Father     No Known Problems Sister     Cancer Maternal Grandmother         breast     Alcohol abuse Maternal Grandfather     Lung Disease Maternal Grandfather         smoker    Lung Disease Paternal Grandmother         smoked    Alcohol abuse Paternal  Grandmother     No Known Problems Paternal Grandfather     Diabetes Neg Hx      Social History     Socioeconomic History    Marital status:     Highest education level: Bachelor's degree (e.g., BA, AB, BS)   Tobacco Use    Smoking status: Former     Current packs/day: 0.50     Average packs/day: 0.5 packs/day for 5.0 years (2.5 ttl pk-yrs)     Types: Cigarettes    Smokeless tobacco: Former   Vaping Use    Vaping status: Never Used   Substance and Sexual Activity    Alcohol use: Yes     Comment: not daily    Drug use: Yes     Types: Marijuana     Comment: occasionally     Sexual activity: Yes     Partners: Female     Birth control/protection: I.U.D.   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    No h/o blood transfusions    No childhood illnesses - has had mono    , 2 kids - healthy     Social Determinants of Health     Financial Resource Strain: Low Risk  (1/2/2023)    Overall Financial Resource Strain (CARDIA)     Difficulty of Paying Living Expenses: Not very hard   Food Insecurity: No Food Insecurity (1/2/2023)    Hunger Vital Sign     Worried About Running Out of Food in the Last Year: Never true     Ran Out of Food in the Last Year: Never true   Transportation Needs: No Transportation Needs (1/2/2023)    PRAPARE - Transportation     Lack of Transportation (Medical): No     Lack of Transportation (Non-Medical): No   Physical Activity: Sufficiently Active (1/2/2023)    Exercise Vital Sign     Days of Exercise per Week: 4 days     Minutes of Exercise per Session: 40 min   Stress: Stress Concern Present (1/2/2023)    [place] [employer] - Occupational Stress Questionnaire     Feeling of Stress : To some extent   Social Connections: Socially Isolated (1/2/2023)    Social Connection and Isolation Panel [NHANES]     Frequency of Communication with Friends and Family: Once a week      "Frequency of Social Gatherings with Friends and Family: Once a week     Attends Gnosticist Services: Never     Active Member of Clubs or Organizations: No     Attends Club or Organization Meetings: Never     Marital Status:    Housing Stability: Low Risk  (1/2/2023)    Housing Stability Vital Sign     Unable to Pay for Housing in the Last Year: No     Number of Places Lived in the Last Year: 1     Unstable Housing in the Last Year: No     Past Surgical History:   Procedure Laterality Date    NASAL POLYPECTOMY  2019    OTHER ORTHOPEDIC SURGERY      left hand surgery-1st, nerve repair, 2nd trigger finger    OTHER ORTHOPEDIC SURGERY      right knee       PSYCHIATRIC EXAMINATION   /70 (BP Location: Left arm, Patient Position: Sitting, BP Cuff Size: Adult)   Pulse 96   Ht 1.88 m (6' 2\")   Wt 114 kg (252 lb)   SpO2 99%   BMI 32.35 kg/m²   Musculoskeletal: No abnormal movements noted  Appearance: well-developed, well-nourished, and appears stated age, cooperative and engaged  Thought Process:  linear, coherent, and goal-oriented  Abnormal or Psychotic Thoughts: denies SI  and No overt delusions noted  Speech: regular rate, rhythm, volume, tone, and syntax  Mood: \"ok\"  Affect: euthymic  Orientation: alert and oriented  Recent and Remote Memory: no gross impairment in immediate, recent, or remote memory  Attention Span and Concentration: sufficient for interview  Insight/Judgement into symptoms: good  Neurological Testing (MSSE Score and/or clock drawing): MMSE not performed during this encounter      SCREENINGS:      2/2/2022     2:30 PM 1/4/2023    10:20 AM 2/27/2024     5:00 PM   Depression Screen (PHQ-2/PHQ-9)   PHQ-2 Total Score 0 0 0           [place]  records   reviewed.  No concerns about misuse of controlled substance.    CURRENT RISK ASSESSMENT       Suicide: Low       Homicide: Low       Self-Harm: Low       Relapse: Not applicable       Crisis Safety Plan Reviewed Not " Indicated    ASSESSMENT/DIAGNOSES/PLAN  Dalton Farias is a 38 y.o. old male who presents today for regularly scheduled follow up for assessment of ADHD. 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 He describes not having anxiety outside of worry in proportion to stressors that doesn't bother him, and sometimes feels sad or down but not regularly. As he is able to focus with Adderall XR and not experiencing side effects, continuation of his current prescription is merited.     Problem List Items Addressed This Visit          Psychiatry Problems    ADHD (attention deficit hyperactivity disorder), combined type    Relevant Medications    amphetamine-dextroamphetamine (ADDERALL XR) 15 MG XR capsule    amphetamine-dextroamphetamine (ADDERALL XR) 15 MG XR capsule (Start on 7/9/2024)          Medication options, alternatives (including no medications) and medication risks/benefits/side effects were discussed in detail.  The patient was advised to call, message clinician on [employer], or come in to the clinic if symptoms worsen or if questions/issues regarding their medications arise.  The patient verbalized understanding and agreement.    The patient was educated to call 911, call the suicide hotline, or go to the local ER if having thoughts of suicide or homicide.  The patient verbalized understanding and agreement.   The proposed treatment plan was discussed with the patient who was provided the opportunity to ask questions and make suggestions regarding alternative treatment. Patient verbalized understanding and expressed agreement with the plan.      Follow up in 1-2 months    This appointment was supervised by attending psychiatrist, Arin Pritchard D.O., who agrees with assessment and treatment plan.  See attending attestation for more details.

## 2024-06-10 NOTE — PSYCHOTHERAPY
Mr. Farias described that he feels communication has improved with his wife. We discussed that his wife's sister seems to dislike him and has only given him minor reasons, but that does create some conflict with his wife as family is important to her. He describes being ok with working the high number of hours he's working, and tries to spend quality time with family although he describes that his wife wants more quantity of time as well. Adderall appears helpful for concentration with his high workload, and continuing to discuss his life with him in the future is merited.

## 2024-07-06 ENCOUNTER — APPOINTMENT (OUTPATIENT)
Dept: RADIOLOGY | Facility: MEDICAL CENTER | Age: 39
End: 2024-07-06
Attending: EMERGENCY MEDICINE
Payer: COMMERCIAL

## 2024-07-06 ENCOUNTER — HOSPITAL ENCOUNTER (EMERGENCY)
Facility: MEDICAL CENTER | Age: 39
End: 2024-07-06
Attending: EMERGENCY MEDICINE
Payer: COMMERCIAL

## 2024-07-06 VITALS
HEART RATE: 69 BPM | BODY MASS INDEX: 31.09 KG/M2 | HEIGHT: 74 IN | TEMPERATURE: 97 F | DIASTOLIC BLOOD PRESSURE: 62 MMHG | OXYGEN SATURATION: 98 % | WEIGHT: 242.29 LBS | SYSTOLIC BLOOD PRESSURE: 116 MMHG | RESPIRATION RATE: 17 BRPM

## 2024-07-06 DIAGNOSIS — R10.9 FLANK PAIN: ICD-10-CM

## 2024-07-06 DIAGNOSIS — R10.31 RLQ ABDOMINAL PAIN: ICD-10-CM

## 2024-07-06 LAB
ALBUMIN SERPL BCP-MCNC: 4.4 G/DL (ref 3.2–4.9)
ALBUMIN/GLOB SERPL: 1.8 G/DL
ALP SERPL-CCNC: 54 U/L (ref 30–99)
ALT SERPL-CCNC: 21 U/L (ref 2–50)
ANION GAP SERPL CALC-SCNC: 16 MMOL/L (ref 7–16)
APPEARANCE UR: ABNORMAL
AST SERPL-CCNC: 21 U/L (ref 12–45)
BACTERIA #/AREA URNS HPF: NEGATIVE /HPF
BASOPHILS # BLD AUTO: 0.9 % (ref 0–1.8)
BASOPHILS # BLD: 0.13 K/UL (ref 0–0.12)
BILIRUB SERPL-MCNC: 0.9 MG/DL (ref 0.1–1.5)
BILIRUB UR QL STRIP.AUTO: NEGATIVE
BUN SERPL-MCNC: 20 MG/DL (ref 8–22)
CALCIUM ALBUM COR SERPL-MCNC: 9.5 MG/DL (ref 8.5–10.5)
CALCIUM SERPL-MCNC: 9.8 MG/DL (ref 8.5–10.5)
CHLORIDE SERPL-SCNC: 107 MMOL/L (ref 96–112)
CO2 SERPL-SCNC: 18 MMOL/L (ref 20–33)
COLOR UR: ABNORMAL
CREAT SERPL-MCNC: 1.26 MG/DL (ref 0.5–1.4)
EOSINOPHIL # BLD AUTO: 0.22 K/UL (ref 0–0.51)
EOSINOPHIL NFR BLD: 1.5 % (ref 0–6.9)
EPI CELLS #/AREA URNS HPF: NEGATIVE /HPF
ERYTHROCYTE [DISTWIDTH] IN BLOOD BY AUTOMATED COUNT: 38 FL (ref 35.9–50)
GFR SERPLBLD CREATININE-BSD FMLA CKD-EPI: 75 ML/MIN/1.73 M 2
GLOBULIN SER CALC-MCNC: 2.4 G/DL (ref 1.9–3.5)
GLUCOSE SERPL-MCNC: 118 MG/DL (ref 65–99)
GLUCOSE UR STRIP.AUTO-MCNC: NEGATIVE MG/DL
HCT VFR BLD AUTO: 42.2 % (ref 42–52)
HGB BLD-MCNC: 15.3 G/DL (ref 14–18)
HYALINE CASTS #/AREA URNS LPF: ABNORMAL /LPF
IMM GRANULOCYTES # BLD AUTO: 0.05 K/UL (ref 0–0.11)
IMM GRANULOCYTES NFR BLD AUTO: 0.3 % (ref 0–0.9)
KETONES UR STRIP.AUTO-MCNC: 40 MG/DL
LEUKOCYTE ESTERASE UR QL STRIP.AUTO: ABNORMAL
LIPASE SERPL-CCNC: 21 U/L (ref 11–82)
LYMPHOCYTES # BLD AUTO: 3.42 K/UL (ref 1–4.8)
LYMPHOCYTES NFR BLD: 22.6 % (ref 22–41)
MCH RBC QN AUTO: 30.9 PG (ref 27–33)
MCHC RBC AUTO-ENTMCNC: 36.3 G/DL (ref 32.3–36.5)
MCV RBC AUTO: 85.3 FL (ref 81.4–97.8)
MICRO URNS: ABNORMAL
MONOCYTES # BLD AUTO: 1.19 K/UL (ref 0–0.85)
MONOCYTES NFR BLD AUTO: 7.9 % (ref 0–13.4)
NEUTROPHILS # BLD AUTO: 10.14 K/UL (ref 1.82–7.42)
NEUTROPHILS NFR BLD: 66.8 % (ref 44–72)
NITRITE UR QL STRIP.AUTO: NEGATIVE
NRBC # BLD AUTO: 0 K/UL
NRBC BLD-RTO: 0 /100 WBC (ref 0–0.2)
PH UR STRIP.AUTO: 5 [PH] (ref 5–8)
PLATELET # BLD AUTO: 278 K/UL (ref 164–446)
PMV BLD AUTO: 10.7 FL (ref 9–12.9)
POTASSIUM SERPL-SCNC: 3.9 MMOL/L (ref 3.6–5.5)
PROT SERPL-MCNC: 6.8 G/DL (ref 6–8.2)
PROT UR QL STRIP: 100 MG/DL
RBC # BLD AUTO: 4.95 M/UL (ref 4.7–6.1)
RBC # URNS HPF: >150 /HPF
RBC UR QL AUTO: ABNORMAL
SODIUM SERPL-SCNC: 141 MMOL/L (ref 135–145)
SP GR UR STRIP.AUTO: 1.03
UROBILINOGEN UR STRIP.AUTO-MCNC: 0.2 MG/DL
WBC # BLD AUTO: 15.2 K/UL (ref 4.8–10.8)
WBC #/AREA URNS HPF: ABNORMAL /HPF

## 2024-07-06 PROCEDURE — 85025 COMPLETE CBC W/AUTO DIFF WBC: CPT

## 2024-07-06 PROCEDURE — 36415 COLL VENOUS BLD VENIPUNCTURE: CPT

## 2024-07-06 PROCEDURE — 700102 HCHG RX REV CODE 250 W/ 637 OVERRIDE(OP): Performed by: STUDENT IN AN ORGANIZED HEALTH CARE EDUCATION/TRAINING PROGRAM

## 2024-07-06 PROCEDURE — 81001 URINALYSIS AUTO W/SCOPE: CPT

## 2024-07-06 PROCEDURE — 700111 HCHG RX REV CODE 636 W/ 250 OVERRIDE (IP): Mod: JZ | Performed by: EMERGENCY MEDICINE

## 2024-07-06 PROCEDURE — 99285 EMERGENCY DEPT VISIT HI MDM: CPT

## 2024-07-06 PROCEDURE — 700105 HCHG RX REV CODE 258: Performed by: EMERGENCY MEDICINE

## 2024-07-06 PROCEDURE — 74176 CT ABD & PELVIS W/O CONTRAST: CPT

## 2024-07-06 PROCEDURE — 96374 THER/PROPH/DIAG INJ IV PUSH: CPT

## 2024-07-06 PROCEDURE — A9270 NON-COVERED ITEM OR SERVICE: HCPCS | Performed by: STUDENT IN AN ORGANIZED HEALTH CARE EDUCATION/TRAINING PROGRAM

## 2024-07-06 PROCEDURE — 96375 TX/PRO/DX INJ NEW DRUG ADDON: CPT

## 2024-07-06 PROCEDURE — 83690 ASSAY OF LIPASE: CPT

## 2024-07-06 PROCEDURE — 80053 COMPREHEN METABOLIC PANEL: CPT

## 2024-07-06 RX ORDER — HYDROCODONE BITARTRATE AND ACETAMINOPHEN 5; 325 MG/1; MG/1
1 TABLET ORAL EVERY 8 HOURS PRN
Qty: 9 TABLET | Refills: 0 | Status: SHIPPED | OUTPATIENT
Start: 2024-07-06 | End: 2024-07-09

## 2024-07-06 RX ORDER — KETOROLAC TROMETHAMINE 15 MG/ML
15 INJECTION, SOLUTION INTRAMUSCULAR; INTRAVENOUS ONCE
Status: COMPLETED | OUTPATIENT
Start: 2024-07-06 | End: 2024-07-06

## 2024-07-06 RX ORDER — SODIUM CHLORIDE, SODIUM LACTATE, POTASSIUM CHLORIDE, CALCIUM CHLORIDE 600; 310; 30; 20 MG/100ML; MG/100ML; MG/100ML; MG/100ML
1000 INJECTION, SOLUTION INTRAVENOUS ONCE
Status: COMPLETED | OUTPATIENT
Start: 2024-07-06 | End: 2024-07-06

## 2024-07-06 RX ORDER — TAMSULOSIN HYDROCHLORIDE 0.4 MG/1
0.4 CAPSULE ORAL DAILY
Qty: 30 CAPSULE | Refills: 0 | Status: SHIPPED | OUTPATIENT
Start: 2024-07-06 | End: 2024-08-05

## 2024-07-06 RX ORDER — MORPHINE SULFATE 4 MG/ML
6 INJECTION INTRAVENOUS ONCE
Status: COMPLETED | OUTPATIENT
Start: 2024-07-06 | End: 2024-07-06

## 2024-07-06 RX ORDER — OXYCODONE HYDROCHLORIDE 5 MG/1
5 TABLET ORAL ONCE
Status: COMPLETED | OUTPATIENT
Start: 2024-07-06 | End: 2024-07-06

## 2024-07-06 RX ORDER — ONDANSETRON 4 MG/1
4 TABLET, ORALLY DISINTEGRATING ORAL EVERY 6 HOURS PRN
Qty: 10 TABLET | Refills: 0 | Status: SHIPPED | OUTPATIENT
Start: 2024-07-06

## 2024-07-06 RX ORDER — NAPROXEN 500 MG/1
500 TABLET ORAL 2 TIMES DAILY WITH MEALS
Qty: 30 TABLET | Refills: 0 | Status: SHIPPED | OUTPATIENT
Start: 2024-07-06 | End: 2024-07-21

## 2024-07-06 RX ORDER — ONDANSETRON 2 MG/ML
4 INJECTION INTRAMUSCULAR; INTRAVENOUS ONCE
Status: COMPLETED | OUTPATIENT
Start: 2024-07-06 | End: 2024-07-06

## 2024-07-06 RX ADMIN — MORPHINE SULFATE 6 MG: 4 INJECTION INTRAVENOUS at 18:46

## 2024-07-06 RX ADMIN — ONDANSETRON 4 MG: 2 INJECTION INTRAMUSCULAR; INTRAVENOUS at 18:46

## 2024-07-06 RX ADMIN — OXYCODONE 5 MG: 5 TABLET ORAL at 21:10

## 2024-07-06 RX ADMIN — KETOROLAC TROMETHAMINE 15 MG: 15 INJECTION, SOLUTION INTRAMUSCULAR; INTRAVENOUS at 19:29

## 2024-07-06 RX ADMIN — SODIUM CHLORIDE, POTASSIUM CHLORIDE, SODIUM LACTATE AND CALCIUM CHLORIDE 1000 ML: 600; 310; 30; 20 INJECTION, SOLUTION INTRAVENOUS at 18:58

## 2024-07-06 RX ADMIN — SODIUM CHLORIDE, POTASSIUM CHLORIDE, SODIUM LACTATE AND CALCIUM CHLORIDE 1000 ML: 600; 310; 30; 20 INJECTION, SOLUTION INTRAVENOUS at 20:05

## 2024-07-06 ASSESSMENT — PAIN DESCRIPTION - PAIN TYPE
TYPE: ACUTE PAIN
TYPE: ACUTE PAIN

## 2024-07-24 DIAGNOSIS — F90.2 ADHD (ATTENTION DEFICIT HYPERACTIVITY DISORDER), COMBINED TYPE: ICD-10-CM

## 2024-07-26 RX ORDER — DEXTROAMPHETAMINE SACCHARATE, AMPHETAMINE ASPARTATE MONOHYDRATE, DEXTROAMPHETAMINE SULFATE AND AMPHETAMINE SULFATE 3.75; 3.75; 3.75; 3.75 MG/1; MG/1; MG/1; MG/1
15 CAPSULE, EXTENDED RELEASE ORAL EVERY MORNING
Qty: 30 CAPSULE | Refills: 0 | Status: SHIPPED | OUTPATIENT
Start: 2024-07-26 | End: 2024-08-25

## 2024-08-20 ENCOUNTER — OFFICE VISIT (OUTPATIENT)
Dept: BEHAVIORAL HEALTH | Facility: PSYCHIATRIC FACILITY | Age: 39
End: 2024-08-20
Payer: COMMERCIAL

## 2024-08-20 VITALS
HEIGHT: 74 IN | SYSTOLIC BLOOD PRESSURE: 112 MMHG | DIASTOLIC BLOOD PRESSURE: 72 MMHG | HEART RATE: 76 BPM | OXYGEN SATURATION: 96 % | BODY MASS INDEX: 32.21 KG/M2 | WEIGHT: 251 LBS

## 2024-08-20 DIAGNOSIS — F90.2 ADHD (ATTENTION DEFICIT HYPERACTIVITY DISORDER), COMBINED TYPE: ICD-10-CM

## 2024-08-20 PROCEDURE — 3078F DIAST BP <80 MM HG: CPT

## 2024-08-20 PROCEDURE — 3074F SYST BP LT 130 MM HG: CPT

## 2024-08-20 PROCEDURE — 99213 OFFICE O/P EST LOW 20 MIN: CPT

## 2024-08-20 RX ORDER — DEXTROAMPHETAMINE SACCHARATE, AMPHETAMINE ASPARTATE MONOHYDRATE, DEXTROAMPHETAMINE SULFATE AND AMPHETAMINE SULFATE 3.75; 3.75; 3.75; 3.75 MG/1; MG/1; MG/1; MG/1
15 CAPSULE, EXTENDED RELEASE ORAL EVERY MORNING
Qty: 30 CAPSULE | Refills: 0 | Status: SHIPPED | OUTPATIENT
Start: 2024-09-22 | End: 2024-10-22

## 2024-08-20 RX ORDER — DEXTROAMPHETAMINE SACCHARATE, AMPHETAMINE ASPARTATE MONOHYDRATE, DEXTROAMPHETAMINE SULFATE AND AMPHETAMINE SULFATE 3.75; 3.75; 3.75; 3.75 MG/1; MG/1; MG/1; MG/1
15 CAPSULE, EXTENDED RELEASE ORAL EVERY MORNING
Qty: 30 CAPSULE | Refills: 0 | Status: SHIPPED | OUTPATIENT
Start: 2024-08-23 | End: 2024-09-22

## 2024-08-20 RX ORDER — DEXTROAMPHETAMINE SACCHARATE, AMPHETAMINE ASPARTATE MONOHYDRATE, DEXTROAMPHETAMINE SULFATE AND AMPHETAMINE SULFATE 3.75; 3.75; 3.75; 3.75 MG/1; MG/1; MG/1; MG/1
15 CAPSULE, EXTENDED RELEASE ORAL EVERY MORNING
Qty: 30 CAPSULE | Refills: 0 | Status: SHIPPED | OUTPATIENT
Start: 2024-10-22 | End: 2024-11-21

## 2024-08-20 ASSESSMENT — ENCOUNTER SYMPTOMS
SEIZURES: 0
CONSTIPATION: 0
CHILLS: 0
LOSS OF CONSCIOUSNESS: 0
FEVER: 0
COUGH: 0
WHEEZING: 0
EYE DISCHARGE: 0
POLYDIPSIA: 0
PALPITATIONS: 0
FALLS: 0
DEPRESSION: 0
SHORTNESS OF BREATH: 0

## 2024-08-20 ASSESSMENT — FIBROSIS 4 INDEX: FIB4 SCORE: 0.63

## 2024-08-20 NOTE — PROGRESS NOTES
"[facility] Outpatient Psychiatric Follow Up Note  Evaluation completed by: Joe Arenas M.D.   Date of Service: 08/20/2024  Appointment type: in-office appointment.  Attending:  Peggy Lenz D.O  Information below was collected from: patient    CHIEF COMPLIANT: Medication refill    HPI:   Dalton Farias is a 38 y.o. old male who presents today for regularly scheduled follow up for assessment of ADHD. He describes Adderall use as \"fine\" and he takes it most morning he works with benefits for concentration, but not on weekends. He feels he's not experiencing negative effects. He described his job has a lot of information coming at him so there can be challenges with that. 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 He is seeking a promotion that would allow him to work at home more, but this is still a hypothetical. He describes he and his wife are in a healthy place financially, and are saving money and have paid off credit card debt. Further details discussed in psychotherapy note for this appointment.    PSYCHIATRIC REVIEW OF SYSTEMS:current symptoms as reported by pt.  Depression: Denies depressed mood or anhedonia  Scott: Patient does not suggest signs or symptoms indicating scott  Anxiety/Panic Attacks: Denies anxiety, except for possible time last week where he felt heat racing and possiblity anxious with heart rate of 75 BPM  Psychosis: Patient reports no signs or symptoms indicative of psychosis  ADHD: Described above in HPI    CURRENT MEDICATIONS    Current Outpatient Medications:     amphetamine-dextroamphetamine (ADDERALL XR) 15 MG XR capsule, Take 1 Capsule by mouth every morning for 30 days. Indications: Attention Deficit Hyperactivity Disorder, Disp: 30 Capsule, Rfl: 0    [START ON 9/22/2024] amphetamine-dextroamphetamine (ADDERALL XR) 15 MG XR capsule, Take 1 " Capsule by mouth every morning for 30 days., Disp: 30 Capsule, Rfl: 0    [START ON 10/22/2024] amphetamine-dextroamphetamine (ADDERALL XR) 15 MG XR capsule, Take 1 Capsule by mouth every morning for 30 days., Disp: 30 Capsule, Rfl: 0    ondansetron (ZOFRAN ODT) 4 MG TABLET DISPERSIBLE, Take 1 Tablet by mouth every 6 hours as needed for Nausea/Vomiting., Disp: 10 Tablet, Rfl: 0    budesonide (PULMICORT) 0.5 MG/2ML Suspension, USE ONE VIAL DAILY AS DIRECTED FOR ASTHMA, Disp: , Rfl:     methylPREDNISolone (MEDROL DOSEPAK) 4 MG Tablet Therapy Pack, As directed on the packaging label., Disp: 21 Tablet, Rfl: 0    Albuterol Sulfate 108 (90 Base) MCG/ACT AEROSOL POWDER, BREATH ACTIVATED, Take 2 Puffs by mouth every 6 hours as needed (for shortness of breath/wheeezing)., Disp: 1 Each, Rfl: 1    beclomethasone (BECONASE-AQ) 42 MCG/SPRAY nasal spray, Administer 2 Sprays into affected nostril(S) 2 times a day., Disp: , Rfl:     multivitamin (THERAGRAN) Tab, Take 1 Tablet by mouth every day., Disp: , Rfl:     Misc Natural Products (JOINT SUPPORT PO), Take  by mouth., Disp: , Rfl:      REVIEW OF SYSTEMS   Review of Systems   Constitutional:  Negative for chills and fever.   HENT:  Negative for hearing loss.    Eyes:  Negative for discharge.   Respiratory:  Negative for cough, shortness of breath and wheezing.    Cardiovascular:  Negative for chest pain and palpitations.   Gastrointestinal:  Negative for constipation.   Genitourinary:  Negative for hematuria.   Musculoskeletal:  Negative for falls.   Skin:  Negative for itching and rash.   Neurological:  Negative for seizures and loss of consciousness.   Endo/Heme/Allergies:  Negative for polydipsia.   Psychiatric/Behavioral:  Negative for depression and suicidal ideas.      PAST MEDICAL HISTORY  Past Medical History:   Diagnosis Date    Allergy     seasonal     Allergies   Allergen Reactions    Sulfa Drugs Unspecified    Sulfamethoxazole      Other Reaction(s): Sulfacetamide  adverse reaction     Past Surgical History:   Procedure Laterality Date    NASAL POLYPECTOMY  2019    OTHER ORTHOPEDIC SURGERY      left hand surgery-1st, nerve repair, 2nd trigger finger    OTHER ORTHOPEDIC SURGERY      right knee      Family History   Problem Relation Age of Onset    Obesity Mother     Obesity Father     No Known Problems Sister     Cancer Maternal Grandmother         breast     Alcohol abuse Maternal Grandfather     Lung Disease Maternal Grandfather         smoker    Lung Disease Paternal Grandmother         smoked    Alcohol abuse Paternal Grandmother     No Known Problems Paternal Grandfather     Diabetes Neg Hx      Social History     Socioeconomic History    Marital status:     Highest education level: Bachelor's degree (e.g., BA, AB, BS)   Tobacco Use    Smoking status: Former     Current packs/day: 0.50     Average packs/day: 0.5 packs/day for 5.0 years (2.5 ttl pk-yrs)     Types: Cigarettes    Smokeless tobacco: Former   Vaping Use    Vaping status: Never Used   Substance and Sexual Activity    Alcohol use: Yes     Comment: not daily    Drug use: Yes     Types: Marijuana     Comment: occasionally     Sexual activity: Yes     Partners: Female     Birth control/protection: I.U.D.   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    No h/o blood transfusions    No childhood illnesses - has had mono    , 2 kids - healthy     Social Determinants of Health     Financial Resource Strain: Low Risk  (1/2/2023)    Overall Financial Resource Strain (CARDIA)     Difficulty of Paying Living Expenses: Not very hard   Food Insecurity: No Food Insecurity (1/2/2023)    Hunger Vital Sign     Worried About Running Out of Food in the Last Year: Never true     Ran Out of Food in the Last Year: Never true   Transportation Needs: No Transportation Needs (1/2/2023)    PRAPARE - Transportation     Lack of  "Transportation (Medical): No     Lack of Transportation (Non-Medical): No   Physical Activity: Sufficiently Active (1/2/2023)    Exercise Vital Sign     Days of Exercise per Week: 4 days     Minutes of Exercise per Session: 40 min   Stress: Stress Concern Present (1/2/2023)    [employer] - Occupational Stress Questionnaire     Feeling of Stress : To some extent   Social Connections: Socially Isolated (1/2/2023)    Social Connection and Isolation Panel [NHANES]     Frequency of Communication with Friends and Family: Once a week     Frequency of Social Gatherings with Friends and Family: Once a week     Attends Spiritism Services: Never     Active Member of Clubs or Organizations: No     Attends Club or Organization Meetings: Never     Marital Status:    Housing Stability: Low Risk  (1/2/2023)    Housing Stability Vital Sign     Unable to Pay for Housing in the Last Year: No     Number of Places Lived in the Last Year: 1     Unstable Housing in the Last Year: No     Past Surgical History:   Procedure Laterality Date    NASAL POLYPECTOMY  2019    OTHER ORTHOPEDIC SURGERY      left hand surgery-1st, nerve repair, 2nd trigger finger    OTHER ORTHOPEDIC SURGERY      right knee       PSYCHIATRIC EXAMINATION   /72 (BP Location: Left arm, Patient Position: Sitting, BP Cuff Size: Adult)   Pulse 76   Ht 1.88 m (6' 2\")   Wt 114 kg (251 lb)   SpO2 96%   BMI 32.23 kg/m²   Musculoskeletal: No abnormal movements noted  Appearance: well-developed, well-nourished, and appears stated age, cooperative and engaged  Thought Process:  linear, coherent, and goal-oriented  Abnormal or Psychotic Thoughts: No evidence of auditory or visual hallucinations, and no overt delusions noted  Speech: regular rate, rhythm, volume, tone, and syntax  Mood: \"good\"  Affect: euthymic  SI/HI: No evidence of SI or HI  Orientation: alert and oriented  Recent and Remote Memory: no gross impairment in immediate, " recent, or remote memory  Attention Span and Concentration: sufficient for interview  Insight/Judgement into symptoms: fair  Neurological Testing (MSSE Score and/or clock drawing): MMSE not performed during this encounter    SCREENINGS:      2/2/2022     2:30 PM 1/4/2023    10:20 AM 2/27/2024     5:00 PM   Depression Screen (PHQ-2/PHQ-9)   PHQ-2 Total Score 0 0 0          [place]  records   reviewed.  No concerns about misuse of controlled substance.    CURRENT RISK ASSESSMENT       Suicide: Low       Homicide: Low       Self-Harm: Low       Relapse: Not applicable       Crisis Safety Plan Reviewed Not Indicated    ASSESSMENT/DIAGNOSES/PLAN  Dalton Farias is a 38 y.o. old male who presents today for regularly scheduled follow up for assessment of ADHD.  He appears to be tolerating Adderall well with continued benefits to energy, focus, and concentration and continuation at this time is merited. He is financially stable, and discussions around interpersonal relationships and career decisions are documented in the psychotherapy note for this appointment. Continued discussions may provide additional psychotherapeutic benefit at these appointments where ADHD treatment can continue to be assessed and provided.    Problem List Items Addressed This Visit       ADHD (attention deficit hyperactivity disorder), combined type    Relevant Medications    amphetamine-dextroamphetamine (ADDERALL XR) 15 MG XR capsule    amphetamine-dextroamphetamine (ADDERALL XR) 15 MG XR capsule (Start on 9/22/2024)    amphetamine-dextroamphetamine (ADDERALL XR) 15 MG XR capsule (Start on 10/22/2024)      Medication options, alternatives (including no medications) and medication risks/benefits/side effects were discussed in detail.  The patient was advised to call, message clinician on [facility], or come in to the clinic if symptoms worsen or if questions/issues regarding their medications arise.  The patient verbalized understanding and  agreement.    The patient was educated to call 911, call the suicide hotline, or go to the local ER if having thoughts of suicide or homicide.  The patient verbalized understanding and agreement.   The proposed treatment plan was discussed with the patient who was provided the opportunity to ask questions and make suggestions regarding alternative treatment. Patient verbalized understanding and expressed agreement with the plan.      Follow up in 2-3 months    This appointment was supervised by attending psychiatrist, Peggy Lenz D.O, who agrees with assessment and treatment plan.  See attending attestation for more details.

## 2024-08-27 NOTE — PSYCHOTHERAPY
Discussed relationship with his wife. 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 These appeared to be in conflict, and that was briefly discussed at the appointment. He does not forsee a promotion in the near future. Continued assessment and discussion may help him navigate this difficult situation and facilitate good communication with his wife that helps clarify this tension.